# Patient Record
Sex: FEMALE | Race: WHITE | NOT HISPANIC OR LATINO | Employment: UNEMPLOYED | URBAN - METROPOLITAN AREA
[De-identification: names, ages, dates, MRNs, and addresses within clinical notes are randomized per-mention and may not be internally consistent; named-entity substitution may affect disease eponyms.]

---

## 2019-02-17 ENCOUNTER — HOSPITAL ENCOUNTER (OUTPATIENT)
Facility: HOSPITAL | Age: 64
Discharge: HOME | End: 2019-02-19
Attending: EMERGENCY MEDICINE | Admitting: HOSPITALIST
Payer: COMMERCIAL

## 2019-02-17 DIAGNOSIS — R11.2 NAUSEA & VOMITING: ICD-10-CM

## 2019-02-17 DIAGNOSIS — R11.2 INTRACTABLE NAUSEA AND VOMITING: ICD-10-CM

## 2019-02-17 DIAGNOSIS — A41.9 SEPSIS: ICD-10-CM

## 2019-02-17 PROBLEM — R19.7 DIARRHEA OF PRESUMED INFECTIOUS ORIGIN: Status: ACTIVE | Noted: 2019-02-17

## 2019-02-17 LAB
ALBUMIN SERPL BCP-MCNC: 4.3 G/DL
ALLENS TEST: ABNORMAL
ALP SERPL-CCNC: 102 U/L
ALT SERPL W/O P-5'-P-CCNC: 23 U/L
ANION GAP SERPL CALC-SCNC: 12 MMOL/L
ANION GAP SERPL CALC-SCNC: 14 MMOL/L
AST SERPL-CCNC: 23 U/L
BASOPHILS # BLD AUTO: 0.02 K/UL
BASOPHILS NFR BLD: 0.2 %
BILIRUB SERPL-MCNC: 0.5 MG/DL
BILIRUB UR QL STRIP: NEGATIVE
BUN SERPL-MCNC: 11 MG/DL
BUN SERPL-MCNC: 16 MG/DL
CALCIUM SERPL-MCNC: 8.9 MG/DL
CALCIUM SERPL-MCNC: 9.7 MG/DL
CHLORIDE SERPL-SCNC: 101 MMOL/L
CHLORIDE SERPL-SCNC: 102 MMOL/L
CLARITY UR REFRACT.AUTO: CLEAR
CO2 SERPL-SCNC: 22 MMOL/L
CO2 SERPL-SCNC: 24 MMOL/L
COLOR UR AUTO: ABNORMAL
CREAT SERPL-MCNC: 0.8 MG/DL
CREAT SERPL-MCNC: 0.8 MG/DL
CTP QC/QA: YES
DIFFERENTIAL METHOD: ABNORMAL
EOSINOPHIL # BLD AUTO: 0.1 K/UL
EOSINOPHIL NFR BLD: 0.8 %
ERYTHROCYTE [DISTWIDTH] IN BLOOD BY AUTOMATED COUNT: 14.1 %
EST. GFR  (AFRICAN AMERICAN): >60 ML/MIN/1.73 M^2
EST. GFR  (AFRICAN AMERICAN): >60 ML/MIN/1.73 M^2
EST. GFR  (NON AFRICAN AMERICAN): >60 ML/MIN/1.73 M^2
EST. GFR  (NON AFRICAN AMERICAN): >60 ML/MIN/1.73 M^2
GLUCOSE SERPL-MCNC: 107 MG/DL
GLUCOSE SERPL-MCNC: 143 MG/DL
GLUCOSE UR QL STRIP: NEGATIVE
HCO3 UR-SCNC: 22.4 MMOL/L (ref 24–28)
HCT VFR BLD AUTO: 42.8 %
HGB BLD-MCNC: 13.6 G/DL
HGB UR QL STRIP: NEGATIVE
IMM GRANULOCYTES # BLD AUTO: 0.02 K/UL
IMM GRANULOCYTES NFR BLD AUTO: 0.2 %
INR PPP: 0.9
KETONES UR QL STRIP: NEGATIVE
LACTATE SERPL-SCNC: 1.4 MMOL/L
LACTATE SERPL-SCNC: 3.3 MMOL/L
LDH SERPL L TO P-CCNC: 243 U/L
LDH SERPL L TO P-CCNC: 5.19 MMOL/L (ref 0.5–2.2)
LEUKOCYTE ESTERASE UR QL STRIP: NEGATIVE
LIPASE SERPL-CCNC: 14 U/L
LYMPHOCYTES # BLD AUTO: 0.2 K/UL
LYMPHOCYTES NFR BLD: 2.6 %
MAGNESIUM SERPL-MCNC: 1.8 MG/DL
MCH RBC QN AUTO: 29.2 PG
MCHC RBC AUTO-ENTMCNC: 31.8 G/DL
MCV RBC AUTO: 92 FL
MONOCYTES # BLD AUTO: 0.5 K/UL
MONOCYTES NFR BLD: 4.9 %
NEUTROPHILS # BLD AUTO: 8.4 K/UL
NEUTROPHILS NFR BLD: 91.3 %
NITRITE UR QL STRIP: NEGATIVE
NRBC BLD-RTO: 0 /100 WBC
PCO2 BLDA: 38.2 MMHG (ref 35–45)
PH SMN: 7.38 [PH] (ref 7.35–7.45)
PH UR STRIP: >8 [PH] (ref 5–8)
PHOSPHATE SERPL-MCNC: 3.5 MG/DL
PLATELET # BLD AUTO: 308 K/UL
PMV BLD AUTO: 10.2 FL
PO2 BLDA: 25 MMHG (ref 40–60)
POC BE: -3 MMOL/L
POC MOLECULAR INFLUENZA A AGN: NEGATIVE
POC MOLECULAR INFLUENZA B AGN: NEGATIVE
POC SATURATED O2: 43 % (ref 95–100)
POC TCO2: 24 MMOL/L (ref 24–29)
POTASSIUM SERPL-SCNC: 3.4 MMOL/L
POTASSIUM SERPL-SCNC: 4.7 MMOL/L
PROCALCITONIN SERPL IA-MCNC: 0.31 NG/ML
PROT SERPL-MCNC: 8.1 G/DL
PROT UR QL STRIP: NEGATIVE
PROTHROMBIN TIME: 9.5 SEC
RBC # BLD AUTO: 4.66 M/UL
SAMPLE: ABNORMAL
SITE: ABNORMAL
SODIUM SERPL-SCNC: 137 MMOL/L
SODIUM SERPL-SCNC: 138 MMOL/L
SP GR UR STRIP: 1.02 (ref 1–1.03)
URN SPEC COLLECT METH UR: ABNORMAL
WBC # BLD AUTO: 9.21 K/UL

## 2019-02-17 PROCEDURE — 25000003 PHARM REV CODE 250: Performed by: PHYSICIAN ASSISTANT

## 2019-02-17 PROCEDURE — 63600175 PHARM REV CODE 636 W HCPCS: Performed by: HOSPITALIST

## 2019-02-17 PROCEDURE — 83690 ASSAY OF LIPASE: CPT

## 2019-02-17 PROCEDURE — 83605 ASSAY OF LACTIC ACID: CPT

## 2019-02-17 PROCEDURE — 25000003 PHARM REV CODE 250: Performed by: STUDENT IN AN ORGANIZED HEALTH CARE EDUCATION/TRAINING PROGRAM

## 2019-02-17 PROCEDURE — 99900035 HC TECH TIME PER 15 MIN (STAT)

## 2019-02-17 PROCEDURE — 83605 ASSAY OF LACTIC ACID: CPT | Mod: 91

## 2019-02-17 PROCEDURE — 80048 BASIC METABOLIC PNL TOTAL CA: CPT

## 2019-02-17 PROCEDURE — 83735 ASSAY OF MAGNESIUM: CPT

## 2019-02-17 PROCEDURE — 83615 LACTATE (LD) (LDH) ENZYME: CPT

## 2019-02-17 PROCEDURE — 63600175 PHARM REV CODE 636 W HCPCS: Performed by: STUDENT IN AN ORGANIZED HEALTH CARE EDUCATION/TRAINING PROGRAM

## 2019-02-17 PROCEDURE — G0378 HOSPITAL OBSERVATION PER HR: HCPCS

## 2019-02-17 PROCEDURE — 25000003 PHARM REV CODE 250: Performed by: HOSPITALIST

## 2019-02-17 PROCEDURE — 87040 BLOOD CULTURE FOR BACTERIA: CPT

## 2019-02-17 PROCEDURE — 96367 TX/PROPH/DG ADDL SEQ IV INF: CPT

## 2019-02-17 PROCEDURE — 80053 COMPREHEN METABOLIC PANEL: CPT

## 2019-02-17 PROCEDURE — 99291 PR CRITICAL CARE, E/M 30-74 MINUTES: ICD-10-PCS | Mod: ,,, | Performed by: EMERGENCY MEDICINE

## 2019-02-17 PROCEDURE — 96372 THER/PROPH/DIAG INJ SC/IM: CPT | Mod: 59

## 2019-02-17 PROCEDURE — 84100 ASSAY OF PHOSPHORUS: CPT

## 2019-02-17 PROCEDURE — 63600175 PHARM REV CODE 636 W HCPCS: Performed by: EMERGENCY MEDICINE

## 2019-02-17 PROCEDURE — S0030 INJECTION, METRONIDAZOLE: HCPCS | Performed by: PHYSICIAN ASSISTANT

## 2019-02-17 PROCEDURE — 96366 THER/PROPH/DIAG IV INF ADDON: CPT

## 2019-02-17 PROCEDURE — 99285 EMERGENCY DEPT VISIT HI MDM: CPT | Mod: 25

## 2019-02-17 PROCEDURE — 63600175 PHARM REV CODE 636 W HCPCS: Performed by: PHYSICIAN ASSISTANT

## 2019-02-17 PROCEDURE — 99291 CRITICAL CARE FIRST HOUR: CPT | Mod: ,,, | Performed by: EMERGENCY MEDICINE

## 2019-02-17 PROCEDURE — 82803 BLOOD GASES ANY COMBINATION: CPT

## 2019-02-17 PROCEDURE — 85610 PROTHROMBIN TIME: CPT

## 2019-02-17 PROCEDURE — 85025 COMPLETE CBC W/AUTO DIFF WBC: CPT

## 2019-02-17 PROCEDURE — 99220 PR INITIAL OBSERVATION CARE,LEVL III: ICD-10-PCS | Mod: ,,, | Performed by: HOSPITALIST

## 2019-02-17 PROCEDURE — 99220 PR INITIAL OBSERVATION CARE,LEVL III: CPT | Mod: ,,, | Performed by: HOSPITALIST

## 2019-02-17 PROCEDURE — 93005 ELECTROCARDIOGRAM TRACING: CPT

## 2019-02-17 PROCEDURE — 81003 URINALYSIS AUTO W/O SCOPE: CPT

## 2019-02-17 PROCEDURE — 93010 ELECTROCARDIOGRAM REPORT: CPT | Mod: ,,, | Performed by: INTERNAL MEDICINE

## 2019-02-17 PROCEDURE — 36415 COLL VENOUS BLD VENIPUNCTURE: CPT

## 2019-02-17 PROCEDURE — 96365 THER/PROPH/DIAG IV INF INIT: CPT

## 2019-02-17 PROCEDURE — 93010 EKG 12-LEAD: ICD-10-PCS | Mod: ,,, | Performed by: INTERNAL MEDICINE

## 2019-02-17 PROCEDURE — 84145 PROCALCITONIN (PCT): CPT

## 2019-02-17 PROCEDURE — 25500020 PHARM REV CODE 255: Performed by: EMERGENCY MEDICINE

## 2019-02-17 RX ORDER — SODIUM CHLORIDE 9 MG/ML
INJECTION, SOLUTION INTRAVENOUS CONTINUOUS
Status: ACTIVE | OUTPATIENT
Start: 2019-02-17 | End: 2019-02-18

## 2019-02-17 RX ORDER — ONDANSETRON 2 MG/ML
4 INJECTION INTRAMUSCULAR; INTRAVENOUS EVERY 8 HOURS PRN
Status: DISCONTINUED | OUTPATIENT
Start: 2019-02-17 | End: 2019-02-19 | Stop reason: HOSPADM

## 2019-02-17 RX ORDER — LEVOTHYROXINE SODIUM 50 UG/1
50 TABLET ORAL
Status: DISCONTINUED | OUTPATIENT
Start: 2019-02-18 | End: 2019-02-19 | Stop reason: HOSPADM

## 2019-02-17 RX ORDER — ACETAMINOPHEN 325 MG/1
650 TABLET ORAL EVERY 8 HOURS PRN
Status: DISCONTINUED | OUTPATIENT
Start: 2019-02-17 | End: 2019-02-17

## 2019-02-17 RX ORDER — ACETAMINOPHEN 650 MG/1
650 SUPPOSITORY RECTAL
Status: COMPLETED | OUTPATIENT
Start: 2019-02-17 | End: 2019-02-17

## 2019-02-17 RX ORDER — ACETAMINOPHEN 500 MG
1000 TABLET ORAL
Status: DISCONTINUED | OUTPATIENT
Start: 2019-02-17 | End: 2019-02-17

## 2019-02-17 RX ORDER — LORAZEPAM 2 MG/ML
1 INJECTION INTRAMUSCULAR EVERY 4 HOURS PRN
Status: DISCONTINUED | OUTPATIENT
Start: 2019-02-17 | End: 2019-02-19 | Stop reason: HOSPADM

## 2019-02-17 RX ORDER — ONDANSETRON 2 MG/ML
4 INJECTION INTRAMUSCULAR; INTRAVENOUS EVERY 6 HOURS PRN
Status: DISCONTINUED | OUTPATIENT
Start: 2019-02-17 | End: 2019-02-17

## 2019-02-17 RX ORDER — MORPHINE SULFATE 4 MG/ML
4 INJECTION, SOLUTION INTRAMUSCULAR; INTRAVENOUS
Status: COMPLETED | OUTPATIENT
Start: 2019-02-17 | End: 2019-02-17

## 2019-02-17 RX ORDER — ROSUVASTATIN CALCIUM 10 MG/1
10 TABLET, COATED ORAL DAILY
Status: DISCONTINUED | OUTPATIENT
Start: 2019-02-18 | End: 2019-02-19 | Stop reason: HOSPADM

## 2019-02-17 RX ORDER — LORAZEPAM 0.5 MG/1
0.5 TABLET ORAL 2 TIMES DAILY
Status: DISCONTINUED | OUTPATIENT
Start: 2019-02-17 | End: 2019-02-19 | Stop reason: HOSPADM

## 2019-02-17 RX ORDER — PROMETHAZINE HYDROCHLORIDE 25 MG/ML
25 INJECTION, SOLUTION INTRAMUSCULAR; INTRAVENOUS
Status: DISCONTINUED | OUTPATIENT
Start: 2019-02-17 | End: 2019-02-17

## 2019-02-17 RX ORDER — LORAZEPAM 2 MG/ML
1 INJECTION INTRAMUSCULAR ONCE
Status: DISCONTINUED | OUTPATIENT
Start: 2019-02-17 | End: 2019-02-17

## 2019-02-17 RX ORDER — HALOPERIDOL 5 MG/ML
5 INJECTION INTRAMUSCULAR ONCE
Status: COMPLETED | OUTPATIENT
Start: 2019-02-17 | End: 2019-02-17

## 2019-02-17 RX ORDER — CIPROFLOXACIN 2 MG/ML
400 INJECTION, SOLUTION INTRAVENOUS
Status: COMPLETED | OUTPATIENT
Start: 2019-02-17 | End: 2019-02-17

## 2019-02-17 RX ORDER — FLUVOXAMINE MALEATE 50 MG/1
75 TABLET ORAL 2 TIMES DAILY
Status: DISCONTINUED | OUTPATIENT
Start: 2019-02-17 | End: 2019-02-18

## 2019-02-17 RX ORDER — METRONIDAZOLE 500 MG/1
500 TABLET ORAL EVERY 8 HOURS
Status: DISCONTINUED | OUTPATIENT
Start: 2019-02-17 | End: 2019-02-17

## 2019-02-17 RX ORDER — ONDANSETRON 2 MG/ML
8 INJECTION INTRAMUSCULAR; INTRAVENOUS
Status: COMPLETED | OUTPATIENT
Start: 2019-02-17 | End: 2019-02-17

## 2019-02-17 RX ORDER — ZOLPIDEM TARTRATE 5 MG/1
5 TABLET ORAL NIGHTLY
Status: DISCONTINUED | OUTPATIENT
Start: 2019-02-17 | End: 2019-02-17

## 2019-02-17 RX ORDER — SODIUM CHLORIDE 0.9 % (FLUSH) 0.9 %
5 SYRINGE (ML) INJECTION
Status: DISCONTINUED | OUTPATIENT
Start: 2019-02-17 | End: 2019-02-19 | Stop reason: HOSPADM

## 2019-02-17 RX ORDER — METRONIDAZOLE 500 MG/100ML
500 INJECTION, SOLUTION INTRAVENOUS
Status: DISCONTINUED | OUTPATIENT
Start: 2019-02-17 | End: 2019-02-19 | Stop reason: HOSPADM

## 2019-02-17 RX ORDER — ACETAMINOPHEN 650 MG/1
650 SUPPOSITORY RECTAL EVERY 4 HOURS PRN
Status: DISCONTINUED | OUTPATIENT
Start: 2019-02-17 | End: 2019-02-19

## 2019-02-17 RX ORDER — MORPHINE SULFATE 2 MG/ML
2 INJECTION, SOLUTION INTRAMUSCULAR; INTRAVENOUS
Status: DISCONTINUED | OUTPATIENT
Start: 2019-02-17 | End: 2019-02-18

## 2019-02-17 RX ORDER — ONDANSETRON 2 MG/ML
4 INJECTION INTRAMUSCULAR; INTRAVENOUS EVERY 8 HOURS PRN
Status: DISCONTINUED | OUTPATIENT
Start: 2019-02-17 | End: 2019-02-17

## 2019-02-17 RX ORDER — ZOLPIDEM TARTRATE 5 MG/1
5 TABLET ORAL NIGHTLY PRN
Status: DISCONTINUED | OUTPATIENT
Start: 2019-02-17 | End: 2019-02-19 | Stop reason: HOSPADM

## 2019-02-17 RX ORDER — CIPROFLOXACIN 2 MG/ML
400 INJECTION, SOLUTION INTRAVENOUS
Status: DISCONTINUED | OUTPATIENT
Start: 2019-02-18 | End: 2019-02-19 | Stop reason: HOSPADM

## 2019-02-17 RX ORDER — MORPHINE SULFATE 2 MG/ML
2 INJECTION, SOLUTION INTRAMUSCULAR; INTRAVENOUS
Status: DISCONTINUED | OUTPATIENT
Start: 2019-02-17 | End: 2019-02-17

## 2019-02-17 RX ORDER — ENOXAPARIN SODIUM 100 MG/ML
40 INJECTION SUBCUTANEOUS EVERY 24 HOURS
Status: DISCONTINUED | OUTPATIENT
Start: 2019-02-18 | End: 2019-02-19 | Stop reason: HOSPADM

## 2019-02-17 RX ORDER — ACETAMINOPHEN 650 MG/1
650 SUPPOSITORY RECTAL EVERY 6 HOURS PRN
Status: DISCONTINUED | OUTPATIENT
Start: 2019-02-17 | End: 2019-02-17

## 2019-02-17 RX ORDER — PROMETHAZINE HYDROCHLORIDE 25 MG/ML
25 INJECTION, SOLUTION INTRAMUSCULAR; INTRAVENOUS
Status: COMPLETED | OUTPATIENT
Start: 2019-02-17 | End: 2019-02-17

## 2019-02-17 RX ADMIN — Medication 2 MG: at 05:02

## 2019-02-17 RX ADMIN — ONDANSETRON 8 MG: 2 INJECTION INTRAMUSCULAR; INTRAVENOUS at 07:02

## 2019-02-17 RX ADMIN — PROMETHAZINE HYDROCHLORIDE 25 MG: 25 INJECTION INTRAMUSCULAR; INTRAVENOUS at 08:02

## 2019-02-17 RX ADMIN — METRONIDAZOLE 500 MG: 500 INJECTION, SOLUTION INTRAVENOUS at 08:02

## 2019-02-17 RX ADMIN — SODIUM CHLORIDE: 0.9 INJECTION, SOLUTION INTRAVENOUS at 05:02

## 2019-02-17 RX ADMIN — LORAZEPAM 1 MG: 2 INJECTION INTRAMUSCULAR; INTRAVENOUS at 04:02

## 2019-02-17 RX ADMIN — ACETAMINOPHEN 650 MG: 650 SUPPOSITORY RECTAL at 11:02

## 2019-02-17 RX ADMIN — LORAZEPAM 1 MG: 2 INJECTION INTRAMUSCULAR; INTRAVENOUS at 11:02

## 2019-02-17 RX ADMIN — IOHEXOL 100 ML: 350 INJECTION, SOLUTION INTRAVENOUS at 10:02

## 2019-02-17 RX ADMIN — SODIUM CHLORIDE, SODIUM LACTATE, POTASSIUM CHLORIDE, AND CALCIUM CHLORIDE 1000 ML: .6; .31; .03; .02 INJECTION, SOLUTION INTRAVENOUS at 08:02

## 2019-02-17 RX ADMIN — PROMETHAZINE HYDROCHLORIDE 25 MG: 25 INJECTION INTRAMUSCULAR; INTRAVENOUS at 10:02

## 2019-02-17 RX ADMIN — MORPHINE SULFATE 4 MG: 4 INJECTION INTRAVENOUS at 12:02

## 2019-02-17 RX ADMIN — ACETAMINOPHEN 650 MG: 650 SUPPOSITORY RECTAL at 05:02

## 2019-02-17 RX ADMIN — HALOPERIDOL LACTATE 5 MG: 5 INJECTION, SOLUTION INTRAMUSCULAR at 08:02

## 2019-02-17 RX ADMIN — SODIUM CHLORIDE, SODIUM LACTATE, POTASSIUM CHLORIDE, AND CALCIUM CHLORIDE 1000 ML: .6; .31; .03; .02 INJECTION, SOLUTION INTRAVENOUS at 07:02

## 2019-02-17 RX ADMIN — MORPHINE SULFATE 4 MG: 4 INJECTION INTRAVENOUS at 08:02

## 2019-02-17 RX ADMIN — CIPROFLOXACIN 400 MG: 2 INJECTION, SOLUTION INTRAVENOUS at 01:02

## 2019-02-17 RX ADMIN — ACETAMINOPHEN 650 MG: 650 SUPPOSITORY RECTAL at 09:02

## 2019-02-17 RX ADMIN — Medication 2 MG: at 08:02

## 2019-02-17 RX ADMIN — ONDANSETRON 4 MG: 2 INJECTION INTRAMUSCULAR; INTRAVENOUS at 03:02

## 2019-02-17 RX ADMIN — PROMETHAZINE HYDROCHLORIDE 12.5 MG: 25 INJECTION INTRAMUSCULAR; INTRAVENOUS at 05:02

## 2019-02-17 RX ADMIN — SODIUM CHLORIDE, SODIUM LACTATE, POTASSIUM CHLORIDE, AND CALCIUM CHLORIDE 1000 ML: .6; .31; .03; .02 INJECTION, SOLUTION INTRAVENOUS at 10:02

## 2019-02-17 NOTE — ED NOTES
Patient c/o burning and itching with Cipro infusion, PIV and site assessed as documented, flushed line with 10cc NS, patient tolerated well and denied any further irritation. Cipro res

## 2019-02-17 NOTE — ED PROVIDER NOTES
Encounter Date: 2/17/2019    SCRIBE #1 NOTE: I, Claudine Solorzano, am scribing for, and in the presence of,  Dr. Cortés. I have scribed the following portions of the note - the Resident attestation.       History     Chief Complaint   Patient presents with    Emesis     Patient reports n/v/d since last night after eating fish last night     Mrs. Brown is a 64 yo F with a PMH of hypothyroid and hyperlipidemia who presents today with sudden onset nausea and vomiting. Pt reports that nausea and vomiting began after broiled fish at a restaurant yesterday. The rest of her family had fried fish and report no symptoms. Within 1 hour after eating dinner she reported significant abdominal discomfort-constant,  8/10, diffuse, cramping. Within 2-3 hours the vomiting began. She vomited 5x overnight. The vomit was never dark black or red and always contained food products. She had multiple episodes of loose bowl movements as well. Pt reports in the past she had refractory C.diff, sigmoid colectomy, and cholecystectomy but has had no abdominal complaints in the past few years.           Review of patient's allergies indicates:   Allergen Reactions    Compazine [prochlorperazine] Anxiety     Past Medical History:   Diagnosis Date    GERD (gastroesophageal reflux disease)     Hypothyroid     IBS (irritable bowel syndrome)      Past Surgical History:   Procedure Laterality Date    CHOLECYSTECTOMY      COLPORRHAPHY      with sphincteroplasty    OOPHORECTOMY      SIGMOIDECTOMY      TOTAL ABDOMINAL HYSTERECTOMY       Family History   Problem Relation Age of Onset    Colon cancer Neg Hx     Inflammatory bowel disease Neg Hx     Celiac disease Neg Hx      Social History     Tobacco Use    Smoking status: Never Smoker   Substance Use Topics    Alcohol use: Not on file    Drug use: Not on file     Review of Systems   Constitutional: Positive for appetite change. Negative for activity change, chills, fatigue and fever.    HENT: Negative for congestion, drooling, facial swelling, mouth sores, nosebleeds, rhinorrhea, sinus pressure, sore throat and tinnitus.    Eyes: Negative for photophobia and visual disturbance.   Respiratory: Negative for apnea, cough, choking, chest tightness and shortness of breath.    Cardiovascular: Negative for chest pain, palpitations and leg swelling.   Gastrointestinal: Positive for abdominal pain, diarrhea, nausea and vomiting. Negative for abdominal distention, blood in stool and rectal pain.   Endocrine: Negative for polyphagia and polyuria.   Genitourinary: Negative for difficulty urinating.   Musculoskeletal: Negative for arthralgias.   Skin: Negative for rash.   Neurological: Negative for dizziness, weakness, light-headedness, numbness and headaches.   Psychiatric/Behavioral: Negative for agitation, behavioral problems and confusion.       Physical Exam     Initial Vitals [02/17/19 0619]   BP Pulse Resp Temp SpO2   137/76 (!) 115 18 98 °F (36.7 °C) 95 %      MAP       --         Physical Exam    Constitutional: She appears well-developed and well-nourished. She is diaphoretic.   HENT:   Head: Normocephalic and atraumatic.   Nose: Nose normal.   Mouth/Throat: No oropharyngeal exudate.   Eyes: Conjunctivae and EOM are normal. Pupils are equal, round, and reactive to light.   Neck: Normal range of motion. Neck supple. No tracheal deviation present. No JVD present.   Cardiovascular: Normal rate, normal heart sounds and intact distal pulses.   No murmur heard.  Pulmonary/Chest: Breath sounds normal. No respiratory distress. She has no wheezes. She has no rales.   Abdominal: Soft. Bowel sounds are normal. She exhibits no distension. There is tenderness. There is no rebound and no guarding.   Diffuse generalized abdominal pain     Musculoskeletal: Normal range of motion.   Neurological: She is alert and oriented to person, place, and time. She has normal strength. GCS score is 15. GCS eye subscore is 4.  GCS verbal subscore is 5. GCS motor subscore is 6.   Skin: Skin is warm. No rash noted. No erythema.   Psychiatric: She has a normal mood and affect. Thought content normal.         ED Course   Procedures  Labs Reviewed   CBC W/ AUTO DIFFERENTIAL - Abnormal; Notable for the following components:       Result Value    MCHC 31.8 (*)     Gran # (ANC) 8.4 (*)     Lymph # 0.2 (*)     Gran% 91.3 (*)     Lymph% 2.6 (*)     All other components within normal limits   COMPREHENSIVE METABOLIC PANEL - Abnormal; Notable for the following components:    CO2 22 (*)     Glucose 143 (*)     All other components within normal limits   URINALYSIS, REFLEX TO URINE CULTURE - Abnormal; Notable for the following components:    pH, UA >8.0 (*)     All other components within normal limits    Narrative:     Preferred Collection Type->Urine, Clean Catch   LACTIC ACID, PLASMA - Abnormal; Notable for the following components:    Lactate (Lactic Acid) 3.3 (*)     All other components within normal limits   ISTAT PROCEDURE - Abnormal; Notable for the following components:    POC PO2 25 (*)     POC HCO3 22.4 (*)     POC SATURATED O2 43 (*)     POC Lactate 5.19 (*)     All other components within normal limits   CULTURE, STOOL   CLOSTRIDIUM DIFFICILE   LIPASE   STOOL EXAM-OVA,CYSTS,PARASITES   WBC, STOOL   POCT INFLUENZA A/B MOLECULAR        ECG Results    None       Imaging Results          CT Abdomen Pelvis With Contrast (Final result)  Result time 02/17/19 10:20:43    Final result by Margarito Ramirez DO (02/17/19 10:20:43)                 Impression:      Hepatomegaly with diffuse fatty infiltration of the liver.    Subtle induration small bowel mesentery with few scattered nonenlarged mesenteric lymph nodes while nonspecific differential to include mesenteric panniculitis.  Clinical correlation and follow-up advised    Small right hepatic cyst.    Remote operative change from cholecystectomy, partial sigmoid resection and  hysterectomy.    Colonic diverticulosis without evidence for acute diverticulitis.    Otherwise unremarkable CT scan abdomen pelvis as detailed above      Electronically signed by: Margarito DO James  Date:    02/17/2019  Time:    10:20             Narrative:    EXAMINATION:  CT ABDOMEN PELVIS WITH CONTRAST    CLINICAL HISTORY:  Abdominal distension;    TECHNIQUE:  Low dose axial images, sagittal and coronal reformations were obtained from the lung bases to the pubic symphysis following the IV administration of 100 mL of Omnipaque 350 .  Oral contrast was not given.    COMPARISON:  None.    FINDINGS:  There is no consolidation in the visualized lungs.  The liver is enlarged measuring 19 cm in craniocaudal dimension.  There is diffuse decreased attenuation liver concerning for fatty infiltration.  Subcentimeter hypodensity within the right lobe of the liver which most suggestive for cyst with Hounsfield units of 6.    There is slight    The spleen and pancreas are normal in size without focal lesion.  Postoperative change from cholecystectomy with surgical clips in the gallbladder fossa.  The adrenal glands are within normal limits bilaterally.    The kidneys are normal in size without hydronephrosis.  There is uptake and excretion of contrast bilaterally.  No signs of appendicitis with normal appendix identified.    Remote operative change from hysterectomy.  No focal pelvic fluid collection or adenopathy.  There is operative change from partial sigmoid resection with anastomosis.  There is colonic diverticulosis without evidence for acute diverticulitis.  No focal dilated loop of bowel to suggest bowel obstruction with scattered fluid and gas within loops of small bowel.    Atherosclerotic plaquing of the aorta.    Gas density within left gluteal soft tissues.    Subtle induration with the small bowel mesentery with few nonenlarged mesenteric lymph nodes concerning for possible mesenteric panniculitis.                                  Medical Decision Making:   History:   Old Medical Records: I decided to obtain old medical records.  Initial Assessment:   Pt reports sudden onset nausea and vomiting within 1 hour after eating boiled fish. Pt had 5x episodes of vomiting food overnight. Vomit only contained food and was not dark black or bright red. Pt also reports loose bowl movements. Pt reports generalized abdominal pain and had constant retching during exam.  Differential Diagnosis:   Food poisoning, staph enteritis from food poisoning, SBO, pancreatitis  Clinical Tests:   Lab Tests: Ordered and Reviewed  Radiological Study: Ordered and Reviewed  Medical Tests: Ordered and Reviewed  ED Management:  CBC  CMP  Lipase  IV zofran  IV fluids    8:45 AM  -Cbc, cmp, lipase wnl  -IV phenergan administered    9:33 AM  -pt reports continued nausea and abdominal pain. Not improving  -ordered CT abdomen    10:55 AM  Pt has fever of 101.2  Blood cultures ordered  tylenol     10:49 AM  Patient continues to have diffused abdominal tenderness, nausea, and unable to tolerate PO. Patient appears uncomfortable. Vitals concerning or tachycardia in the 115-120 despite iv fluid hydration. Repeat temp taken 101.6 Patient blood cultures, flu swab, and tylenol ordered.    11:06 AM  On reevaluation, patient complaining of abdominal pain and retching. Labs show lactate of 5 after 2 liters of IV fluids. CT concerning for possible mesenteric ischemia vs. Panniculitis. General surgery paged.     11:42 AM  General surgery evaluated patient. They believe sx are not consistent with mesenteric ischemia. Will discuss with staff. Likely admit for sympotmatic and contractional emesis and serial abdominal exams.  Other:   I have discussed this case with another health care provider.            Scribe Attestation:   Scribe #1: I performed the above scribed service and the documentation accurately describes the services I performed. I attest to the accuracy of the  note.    Attending Attestation:   Physician Attestation Statement for Resident:  As the supervising MD   Physician Attestation Statement: I have personally seen and examined this patient.   I agree with the above history. -: Emergent evaluation 63-year-old female history of IBS, remote C diff history here today with acute onset nausea vomiting diarrhea since approximately since last night after eating broiled fish.  Patient reports greater than 5 episodes of nausea and vomiting with continuous retching, loose stools but not watery.  No recent antibiotic use.  No other sick contacts.   As the supervising MD I agree with the above PE.   -: General:  Appears uncomfortable, actively retching at bedside  Cardiac:  Normal S1-S2  Lungs:  Clear to auscultation  Abdomen:  Diffuse abdominal tenderness with no rebound or guarding, mild abdominal distention, hyperactive bowel sounds  Extremity:  No peripheral edema   As the supervising MD I agree with the above treatment, course, plan, and disposition.   -: 7:30 am  Delay in initial labs secondary to difficult IV access.  Multiple attempts were made with ultrasound guidance without success.  Finally right ankle vein was cannulated without issue and flowing well.    8:00 AM  Labs are unremarkable. Will continue IV fluids and antiemetics.      9:30 AM pt re-evaluated, continued pain and retching despite medications.  CT ordered    10:20 AM CT abdomen pelvis with subtle induration of small bowel mesentery with scattered nonspecific mesenteric lymph nodes- possible mesenteric panniculiits .  General surgery was consulted, they evaluated patient- no acute surgical intervention.  Will continue to monitor with serial abdominal exams.  POC lactate ordered    11:00 AM notified by nurse that patient spiked a fever 101 and is tachycardic.  POC lactate is 5.0- ? Infectious vs dehydration. Tylenol ordered.   cultures ordered.    Pt treated with cipro for possible infectious etiology.   Patient will be admitted for further treatment and evaluation.  I have reviewed and agree with the residents interpretation of the following: lab data, CT scans and EKG.        Attending Critical Care:   Critical Care Times:   ==============================================================  · Total Critical Care Time - exclusive of procedural time: 45 minutes.  ==============================================================  Critical care was necessary to treat or prevent imminent or life-threatening deterioration of the following conditions: dehydration (N/V/D).   Critical care was time spent personally by me on the following activities: examination of patient, review of old charts, ordering lab, x-rays, and/or EKG, re-evaluation of patient's conition and evaluation of patient's response to treatment.   Critical Care Condition: potentially life-threatening                  Clinical Impression:   Diagnoses of Intractable nausea and vomiting, Nausea & vomiting, and Sepsis were pertinent to this visit.      Disposition:   Disposition: Discharged  Condition: Stable                        Florence Cortés MD  02/18/19 [id number]

## 2019-02-17 NOTE — ED NOTES
Halina Brown, 63 y.o. female, presents to the ED w/ complaint of consistent vomiting which began around 0100; pt states that she is in town visiting and ate fried fish last night which may have caused these symptoms; pt denies food allergies and/or SOB; endorses multiple episodes of vomiting, diarrhea, dizziness, and weakness; denies LOC; denies other concerns at this time     Triage note:  Chief Complaint   Patient presents with    Emesis     Patient reports n/v/d since last night after eating fish last night       The following is provided by the pt and/or family, as well as information provided in pt's chart:      Review of patient's allergies indicates:   Allergen Reactions    Compazine [prochlorperazine] Anxiety     Past Medical History:   Diagnosis Date    GERD (gastroesophageal reflux disease)     Hypothyroid     IBS (irritable bowel syndrome)        Pt identity confirmed; pt educated on course of action in and purpose of intake area; pt advised to call for assistance when needed; pt educated on reasons for holding PO intake until lab work and imaging have resulted; Pt informed on nearest restroom's location; pt verbalized understanding & agreed

## 2019-02-17 NOTE — CONSULTS
[facility]  General Surgery  Consult Note    Consults  Subjective:     Chief Complaint/Reason for Admission: Nausea, vomiting, loose stools    History of Present Illness: Hlaina Brown is a 63 y.o. female with no significant past medical history who presents to the ER today after having multiple bouts of emesis and loose stools beginning last night after eating fish for dinner. She feels her abdomen is tight. She also feels the need to have more bowel movements but after having had loose stools last night now feels constipated. She continues to have significant nausea. She has a surgical hx of hysterectomy, sigmoidectomy, and cholecystectomy.     No current facility-administered medications on file prior to encounter.      Current Outpatient Medications on File Prior to Encounter   Medication Sig    amitriptyline (ELAVIL) 25 MG tablet Take 1 tablet (25 mg total) by mouth every evening. Take 1/2 tablet each night first week, then increase to 1 tablet each night    CRESTOR 10 mg tablet     dronabinol (MARINOL) 2.5 MG capsule Take 1 capsule (2.5 mg total) by mouth 2 (two) times daily before meals.    fluvoxamine (LUVOX) 50 MG Tab     hydrocodone-acetaminophen 10-325mg (NORCO)  mg Tab     hydrocodone-acetaminophen 5-325mg (NORCO) 5-325 mg per tablet Take 1 tablet by mouth every 4 to 6 hours as needed.    lorazepam (ATIVAN) 1 MG tablet     LYRICA 50 mg capsule     ondansetron (ZOFRAN-ODT) 8 MG TbDL Take 1 tablet (8 mg total) by mouth every 8 (eight) hours as needed.    pantoprazole (PROTONIX) 40 MG tablet Take 1 tablet (40 mg total) by mouth once daily.    promethazine (PHENERGAN) 25 MG tablet Take 1 tablet (25 mg total) by mouth every 4 (four) hours as needed for Nausea.    ranitidine (ZANTAC) 150 MG tablet Take 150 mg by mouth 3 (three) times daily.    sodium,potassium,&mag sulfates (SUPREP BOWEL PREP KIT) 17.5-3.13-1.6 gram SolR Take as directed in instructions     SYNTHROID 50 mcg tablet     tamsulosin (FLOMAX) 0.4 mg Cp24     triamterene-hydrochlorothiazide 37.5-25 mg (DYAZIDE) 37.5-25 mg per capsule     zolpidem (AMBIEN) 10 mg Tab        Review of patient's allergies indicates:   Allergen Reactions    Compazine [prochlorperazine] Anxiety       Past Medical History:   Diagnosis Date    GERD (gastroesophageal reflux disease)     Hypothyroid     IBS (irritable bowel syndrome)      Past Surgical History:   Procedure Laterality Date    CHOLECYSTECTOMY      COLPORRHAPHY      with sphincteroplasty    OOPHORECTOMY      SIGMOIDECTOMY      TOTAL ABDOMINAL HYSTERECTOMY       Family History     None        Tobacco Use    Smoking status: Never Smoker   Substance and Sexual Activity    Alcohol use: Not on file    Drug use: Not on file    Sexual activity: Not on file     Review of Systems   Constitutional: Positive for appetite change and fever. Negative for chills.   Respiratory: Negative for shortness of breath.    Cardiovascular: Negative for chest pain.   Gastrointestinal: Positive for abdominal pain, diarrhea, nausea and vomiting.   All other systems reviewed and are negative.    Objective:     Vital Signs (Most Recent):  Temp: (!) 101.1 °F (38.4 °C) (02/17/19 1059)  Pulse: (!) 113 (02/17/19 1103)  Resp: (!) 38 (02/17/19 1103)  BP: (!) 184/79 (02/17/19 1103)  SpO2: 98 % (02/17/19 1103) Vital Signs (24h Range):  Temp:  [98 °F (36.7 °C)-101.1 °F (38.4 °C)] 101.1 °F (38.4 °C)  Pulse:  [103-115] 113  Resp:  [18-38] 38  SpO2:  [95 %-98 %] 98 %  BP: (137-184)/(72-84) 184/79     Weight: 102.1 kg (225 lb)  Body mass index is 37.44 kg/m².      Intake/Output Summary (Last 24 hours) at 2/17/2019 1146  Last data filed at 2/17/2019 0945  Gross per 24 hour   Intake 1001 ml   Output --   Net 1001 ml       Physical Exam   Constitutional: She is oriented to person, place, and time. She appears well-developed and well-nourished. She appears distressed.   HENT:   Head: Normocephalic and  atraumatic.   Eyes: EOM are normal.   Cardiovascular:   tachycardic   Pulmonary/Chest: Effort normal. No respiratory distress.   Abdominal: Soft. She exhibits no distension. There is no tenderness. There is no rebound and no guarding.   Neurological: She is oriented to person, place, and time. No cranial nerve deficit.   Psychiatric: She has a normal mood and affect. Her behavior is normal.       Significant Labs:  CBC:   Recent Labs   Lab 02/17/19  0758   WBC 9.21   RBC 4.66   HGB 13.6   HCT 42.8      MCV 92   MCH 29.2   MCHC 31.8*     CMP:   Recent Labs   Lab 02/17/19  0758   *   CALCIUM 9.7   ALBUMIN 4.3   PROT 8.1      K 4.7   CO2 22*      BUN 16   CREATININE 0.8   ALKPHOS 102   ALT 23   AST 23   BILITOT 0.5     Lactic Acid: No results for input(s): LACTATE in the last 48 hours.    Significant Diagnostics:  I have reviewed all pertinent imaging results/findings within the past 24 hours.    Assessment/Plan:     Halina Brown is a 63 y.o. female with likely gastroenteritis    Recommend admission to medicine for rehydration  Trend lactic acids  Will continue serial abdominal exams    Thank you for your consult. I will sign off. Please contact us if you have any additional questions.    Gordy Loomis MD  General Surgery  [facility]

## 2019-02-17 NOTE — ED NOTES
Patient transported on stretcher to room 3076A via escort with IV pole and all belongings. Patient AAOx4, VSS, reports pain still managed and nausea is minimal.

## 2019-02-17 NOTE — ASSESSMENT & PLAN NOTE
Scheduled IV phenergan q6h, PRN IV zofran q8h, IV ativan 1 mg for breakthrough N/V not controlled with zofran/phenergan.   EKG to check baseline QTc.   IVF resuscitation, at 150 ml/h x15h.   S/p 2L bolus in ED.   CT abdo not consistent w/ obstruction.

## 2019-02-17 NOTE — ED NOTES
Hourly rounding performed at this time.  at bedside. Patient is in bed awake and alert, resting,reports pain has decreased to 6/10 and she is still nauseous, but dry heaving less frequently. Assessed for need of repositioning and checked positioning of purewick, no adjustment required. Discussed care plan, patient denies any additional needs at this time and has no complaints or questions. Room assessed for safety measures and cleanliness, no action needed at this time. The bed is in low, locked position with side rails up x 2. Personal belongings and call light in reach. Patient is oriented to call light use. Patient verbalizes will call nurse if assistance is needed.

## 2019-02-17 NOTE — ED NOTES
Dr. Cortés at bedside with U/S guided attempt at IV.  18g to left upper arm place, infiltrated.  Dr. Cortés will attempt another line placement.  Pt continues with generalized abd pain and nausea.

## 2019-02-17 NOTE — ED NOTES
Pt reports pain has decreased from 7/10.  Pt states that she continues with nausea.  Dr. Cortés aware and will order further medication.

## 2019-02-17 NOTE — SUBJECTIVE & OBJECTIVE
Past Medical History:   Diagnosis Date    GERD (gastroesophageal reflux disease)     Hypothyroid     IBS (irritable bowel syndrome)        Past Surgical History:   Procedure Laterality Date    CHOLECYSTECTOMY      COLPORRHAPHY      with sphincteroplasty    OOPHORECTOMY      SIGMOIDECTOMY      TOTAL ABDOMINAL HYSTERECTOMY         Review of patient's allergies indicates:   Allergen Reactions    Compazine [prochlorperazine] Anxiety       No current facility-administered medications on file prior to encounter.      Current Outpatient Medications on File Prior to Encounter   Medication Sig    amitriptyline (ELAVIL) 25 MG tablet Take 1 tablet (25 mg total) by mouth every evening. Take 1/2 tablet each night first week, then increase to 1 tablet each night    CRESTOR 10 mg tablet     dronabinol (MARINOL) 2.5 MG capsule Take 1 capsule (2.5 mg total) by mouth 2 (two) times daily before meals.    fluvoxamine (LUVOX) 50 MG Tab     hydrocodone-acetaminophen 10-325mg (NORCO)  mg Tab     hydrocodone-acetaminophen 5-325mg (NORCO) 5-325 mg per tablet Take 1 tablet by mouth every 4 to 6 hours as needed.    lorazepam (ATIVAN) 1 MG tablet     LYRICA 50 mg capsule     ondansetron (ZOFRAN-ODT) 8 MG TbDL Take 1 tablet (8 mg total) by mouth every 8 (eight) hours as needed.    pantoprazole (PROTONIX) 40 MG tablet Take 1 tablet (40 mg total) by mouth once daily.    promethazine (PHENERGAN) 25 MG tablet Take 1 tablet (25 mg total) by mouth every 4 (four) hours as needed for Nausea.    ranitidine (ZANTAC) 150 MG tablet Take 150 mg by mouth 3 (three) times daily.    sodium,potassium,&mag sulfates (SUPREP BOWEL PREP KIT) 17.5-3.13-1.6 gram SolR Take as directed in instructions    SYNTHROID 50 mcg tablet     tamsulosin (FLOMAX) 0.4 mg Cp24     triamterene-hydrochlorothiazide 37.5-25 mg (DYAZIDE) 37.5-25 mg per capsule     zolpidem (AMBIEN) 10 mg Tab      Family History     None        Tobacco Use    Smoking  status: Never Smoker   Substance and Sexual Activity    Alcohol use: Not on file    Drug use: Not on file    Sexual activity: Not on file     Review of Systems   Constitutional: Positive for chills and fever. Negative for activity change, appetite change, diaphoresis, fatigue and unexpected weight change.   Eyes: Negative for photophobia and visual disturbance.   Respiratory: Negative for apnea, cough, choking, shortness of breath, wheezing and stridor.    Cardiovascular: Negative for chest pain, palpitations and leg swelling.   Gastrointestinal: Positive for abdominal pain, diarrhea, nausea and vomiting. Negative for blood in stool and constipation.   Endocrine: Negative for cold intolerance and heat intolerance.   Genitourinary: Negative for difficulty urinating and dysuria.   Musculoskeletal: Negative for arthralgias and myalgias.   Skin: Negative for rash and wound.   Allergic/Immunologic: Negative for immunocompromised state.   Neurological: Positive for headaches. Negative for dizziness and weakness.   Psychiatric/Behavioral: Negative for agitation, behavioral problems and confusion. The patient is nervous/anxious.      Objective:     Vital Signs (Most Recent):  Temp: 99.9 °F (37.7 °C) (02/17/19 1638)  Pulse: 101 (02/17/19 1638)  Resp: (!) 26 (02/17/19 1638)  BP: 137/63 (02/17/19 1638)  SpO2: 97 % (02/17/19 1638) Vital Signs (24h Range):  Temp:  [98 °F (36.7 °C)-102.2 °F (39 °C)] 99.9 °F (37.7 °C)  Pulse:  [101-126] 101  Resp:  [17-38] 26  SpO2:  [95 %-100 %] 97 %  BP: (130-184)/(59-93) 137/63     Weight: 102.1 kg (225 lb)  Body mass index is 37.44 kg/m².    Physical Exam   Constitutional: She is oriented to person, place, and time. She appears well-developed and well-nourished. She appears ill. She appears distressed.   HENT:   Head: Normocephalic and atraumatic.   Mouth/Throat: No oropharyngeal exudate.   Eyes: Conjunctivae and EOM are normal. Pupils are equal, round, and reactive to light. No scleral  icterus.   Neck: Normal range of motion. Neck supple. No tracheal deviation present. No thyromegaly present.   Cardiovascular: Regular rhythm, normal heart sounds and intact distal pulses. Tachycardia present.   No murmur heard.  Pulmonary/Chest: Effort normal and breath sounds normal. No respiratory distress. She has no wheezes. She has no rales. She exhibits no tenderness.   Abdominal: Soft. She exhibits no distension. Bowel sounds are decreased. There is tenderness. There is no rebound and no guarding.   Musculoskeletal: Normal range of motion. She exhibits no edema or tenderness.   Lymphadenopathy:     She has no cervical adenopathy.   Neurological: She is alert and oriented to person, place, and time.   Skin: Skin is warm and dry. No rash noted. She is not diaphoretic. No erythema. No pallor.   Psychiatric: Her behavior is normal. Judgment and thought content normal. Her mood appears anxious.         CRANIAL NERVES     CN III, IV, VI   Pupils are equal, round, and reactive to light.  Extraocular motions are normal.        Significant Labs:   CBC:   Recent Labs   Lab 02/17/19  0758   WBC 9.21   HGB 13.6   HCT 42.8        CMP:   Recent Labs   Lab 02/17/19  0758      K 4.7      CO2 22*   *   BUN 16   CREATININE 0.8   CALCIUM 9.7   PROT 8.1   ALBUMIN 4.3   BILITOT 0.5   ALKPHOS 102   AST 23   ALT 23   ANIONGAP 14   EGFRNONAA >60.0       Significant Imaging: I have reviewed and interpreted all pertinent imaging results/findings within the past 24 hours.     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  Clinical correlation and follow-up advised

## 2019-02-17 NOTE — ED NOTES
"Assumed care, received report from JASON Carr.  at bedside. Patient is in bed awake, alert, anxious, tearful and oriented x 4, cooperative, VSS, except BP and HR elevated, MD is aware, airway open and patent, respirations spontaneous, even and unlabored, with normal rate and rhythm, skin warm, CDI, B inner gluteal fold are red but blanches, patient/spouse instructed on skin breakdown prevention, spouse verbalized understanding, patient just shook her head and stated "I don't care right now, I feel too bad", abd is soft, tender to palpation, and slightly distended, patient continues to report nausea and frequent dry heaving Patient moves all extremities well, no weakness or edema observed. R foot PIV, intact, patent, and currently infusing LR, site is WNL, no erythema, pain or edema to site, dressing is CDI. Patient c/o generalized body aches and pains, and back pain 10/10, refused assistance with repositioning. Assisted patient on bedpan to void. Updated patient/ on current status, discussed care plan, patient denies any additional needs at this time and has no complaints or questions. Room assessed for safety measures and cleanliness, no action needed at this time. The bed is in low, locked position with side rails up x 2. Personal belongings and call light in reach.  is oriented to call light use and verbalizes will call nurse if assistance is needed. Will continue to monitor.         "

## 2019-02-17 NOTE — H&P
"[facility] Hospital Medicine  History & Physical    Patient Name: Halina Brown  MRN: [id number]  Admission Date: 2/17/2019  Attending Physician: Juanita Fernandez*   Primary Care Provider: Sraah Beth Johnson ([facility])    [facility] Medicine Team: [facility] R Juanita Fernandez MD     Patient information was obtained from patient and ER records.     Subjective:     Principal Problem:Sepsis    Chief Complaint:   Chief Complaint   Patient presents with    Emesis     Patient reports n/v/d since last night after eating fish last night        HPI: Mrs. Brown is a 62 yo F w/ GERD, hypothyroid, IBS who presented to the ED in the early hours of 2/17 with sudden onset abdominal pain (described as 10/10 in intensity, cramping in nature, and transient and wandering in location) which began about 10pm last night. Symptoms were associated with multiple episodes (>10) of vomiting (nonbloody) that did not improve following zofran and phenergan at home. Additionally, she had 3 "soft, but not runny" non-bloody bowel movements. She believes her symptoms were precipitated by fish she had at dinner around 8pm. She has never had an episode like this, was in her usual state of health prior to symptom onset, and denies fevers/chills, SOB, CP, syncope, rash/wound, sick contacts.     In the ED, she was found to be febrile, tachycardic, and in acute distress d/t pain/nausea which was not improved with phenergan/zofran. Her pain was transiently improved with morphine but returned 1 hour later. Surgery was consulted due to concern for acute abdomen and they recommended serial abdo exams and hydration. A CT showed subtle induration small bowel mesentery with few scattered nonenlarged mesenteric lymph nodes while nonspecific differential to include mesenteric panniculitis.     Past Medical History:   Diagnosis Date    GERD (gastroesophageal reflux disease)     Hypothyroid     IBS (irritable bowel syndrome)  "       Past Surgical History:   Procedure Laterality Date    CHOLECYSTECTOMY      COLPORRHAPHY      with sphincteroplasty    OOPHORECTOMY      SIGMOIDECTOMY      TOTAL ABDOMINAL HYSTERECTOMY         Review of patient's allergies indicates:   Allergen Reactions    Compazine [prochlorperazine] Anxiety       No current facility-administered medications on file prior to encounter.      Current Outpatient Medications on File Prior to Encounter   Medication Sig    amitriptyline (ELAVIL) 25 MG tablet Take 1 tablet (25 mg total) by mouth every evening. Take 1/2 tablet each night first week, then increase to 1 tablet each night    CRESTOR 10 mg tablet     dronabinol (MARINOL) 2.5 MG capsule Take 1 capsule (2.5 mg total) by mouth 2 (two) times daily before meals.    fluvoxamine (LUVOX) 50 MG Tab     hydrocodone-acetaminophen 10-325mg (NORCO)  mg Tab     hydrocodone-acetaminophen 5-325mg (NORCO) 5-325 mg per tablet Take 1 tablet by mouth every 4 to 6 hours as needed.    lorazepam (ATIVAN) 1 MG tablet     LYRICA 50 mg capsule     ondansetron (ZOFRAN-ODT) 8 MG TbDL Take 1 tablet (8 mg total) by mouth every 8 (eight) hours as needed.    pantoprazole (PROTONIX) 40 MG tablet Take 1 tablet (40 mg total) by mouth once daily.    promethazine (PHENERGAN) 25 MG tablet Take 1 tablet (25 mg total) by mouth every 4 (four) hours as needed for Nausea.    ranitidine (ZANTAC) 150 MG tablet Take 150 mg by mouth 3 (three) times daily.    sodium,potassium,&mag sulfates (SUPREP BOWEL PREP KIT) 17.5-3.13-1.6 gram SolR Take as directed in instructions    SYNTHROID 50 mcg tablet     tamsulosin (FLOMAX) 0.4 mg Cp24     triamterene-hydrochlorothiazide 37.5-25 mg (DYAZIDE) 37.5-25 mg per capsule     zolpidem (AMBIEN) 10 mg Tab      Family History     None        Tobacco Use    Smoking status: Never Smoker   Substance and Sexual Activity    Alcohol use: Not on file    Drug use: Not on file    Sexual activity: Not on file      Review of Systems   Constitutional: Positive for chills and fever. Negative for activity change, appetite change, diaphoresis, fatigue and unexpected weight change.   Eyes: Negative for photophobia and visual disturbance.   Respiratory: Negative for apnea, cough, choking, shortness of breath, wheezing and stridor.    Cardiovascular: Negative for chest pain, palpitations and leg swelling.   Gastrointestinal: Positive for abdominal pain, diarrhea, nausea and vomiting. Negative for blood in stool and constipation.   Endocrine: Negative for cold intolerance and heat intolerance.   Genitourinary: Negative for difficulty urinating and dysuria.   Musculoskeletal: Negative for arthralgias and myalgias.   Skin: Negative for rash and wound.   Allergic/Immunologic: Negative for immunocompromised state.   Neurological: Positive for headaches. Negative for dizziness and weakness.   Psychiatric/Behavioral: Negative for agitation, behavioral problems and confusion. The patient is nervous/anxious.      Objective:     Vital Signs (Most Recent):  Temp: 99.9 °F (37.7 °C) (02/17/19 1638)  Pulse: 101 (02/17/19 1638)  Resp: (!) 26 (02/17/19 1638)  BP: 137/63 (02/17/19 1638)  SpO2: 97 % (02/17/19 1638) Vital Signs (24h Range):  Temp:  [98 °F (36.7 °C)-102.2 °F (39 °C)] 99.9 °F (37.7 °C)  Pulse:  [101-126] 101  Resp:  [17-38] 26  SpO2:  [95 %-100 %] 97 %  BP: (130-184)/(59-93) 137/63     Weight: 102.1 kg (225 lb)  Body mass index is 37.44 kg/m².    Physical Exam   Constitutional: She is oriented to person, place, and time. She appears well-developed and well-nourished. She appears ill. She appears distressed.   HENT:   Head: Normocephalic and atraumatic.   Mouth/Throat: No oropharyngeal exudate.   Eyes: Conjunctivae and EOM are normal. Pupils are equal, round, and reactive to light. No scleral icterus.   Neck: Normal range of motion. Neck supple. No tracheal deviation present. No thyromegaly present.   Cardiovascular: Regular rhythm,  normal heart sounds and intact distal pulses. Tachycardia present.   No murmur heard.  Pulmonary/Chest: Effort normal and breath sounds normal. No respiratory distress. She has no wheezes. She has no rales. She exhibits no tenderness.   Abdominal: Soft. She exhibits no distension. Bowel sounds are decreased. There is tenderness. There is no rebound and no guarding.   Musculoskeletal: Normal range of motion. She exhibits no edema or tenderness.   Lymphadenopathy:     She has no cervical adenopathy.   Neurological: She is alert and oriented to person, place, and time.   Skin: Skin is warm and dry. No rash noted. She is not diaphoretic. No erythema. No pallor.   Psychiatric: Her behavior is normal. Judgment and thought content normal. Her mood appears anxious.         CRANIAL NERVES     CN III, IV, VI   Pupils are equal, round, and reactive to light.  Extraocular motions are normal.        Significant Labs:   CBC:   Recent Labs   Lab 02/17/19  0758   WBC 9.21   HGB 13.6   HCT 42.8        CMP:   Recent Labs   Lab 02/17/19  0758      K 4.7      CO2 22*   *   BUN 16   CREATININE 0.8   CALCIUM 9.7   PROT 8.1   ALBUMIN 4.3   BILITOT 0.5   ALKPHOS 102   AST 23   ALT 23   ANIONGAP 14   EGFRNONAA >60.0       Significant Imaging: I have reviewed and interpreted all pertinent imaging results/findings within the past 24 hours.     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  Clinical correlation and follow-up advised    Assessment/Plan:     * Sepsis    Febrile, tachycardic, no leukocytosis but R shift present. Lactic acid elevated 3.3. 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 UA not indicative of infection.   Check blood cultures, LDH, procalcitonin, repeat lactic acid.   Cipro/Flagyl initiated for intra-abdominal infection;  no other suspected source.   Scheduled phenergan, prn zofran/morphine/ativan for symptom management.   IVF infusing. Hemodynamically stable.          Diarrhea of presumed infectious origin    Onset of symptoms correlates temporally to fish ingested for dinner last night.   Will send off for stool cultures, O/C/P, C diff, WBC.   IVF resuscitation and lyte repletion.   IV morphine for severe pain control, tylenol for fevers and mild pain.      Intractable nausea and vomiting    Scheduled IV phenergan q6h, PRN IV zofran q8h, IV ativan 1 mg for breakthrough N/V not controlled with zofran/phenergan.   EKG to check baseline QTc.   IVF resuscitation, at 150 ml/h x15h.   S/p 2L bolus in ED.   CT abdo not consistent w/ obstruction.        VTE Risk Mitigation (From admission, onward)        Ordered     enoxaparin injection 40 mg  Daily      02/17/19 1517     IP VTE HIGH RISK PATIENT  Once      02/17/19 1517     Place sequential compression device  Until discontinued      02/17/19 1303             Juanita Fernandez MD  Department of Hospital Medicine   [facility]

## 2019-02-17 NOTE — ASSESSMENT & PLAN NOTE
Febrile, tachycardic, no leukocytosis but R shift present. Lactic acid elevated 3.3. 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 UA not indicative of infection.   Check blood cultures, LDH, procalcitonin, repeat lactic acid.   Cipro/Flagyl initiated for intra-abdominal infection; no other suspected source.   Scheduled phenergan, prn zofran/morphine/ativan for symptom management.   IVF infusing. Hemodynamically stable.

## 2019-02-17 NOTE — ED NOTES
Hourly rounding performed at this time.  at bedside. Patient is in bed awake and alert, resting, reports pain has decreased to 5/10 and she is feeling only slightly nauseous, dry heaving has decreased markedly. Assessed for need of repositioning and checked positioning of purewick, no adjustment required. Discussed care plan, patient denies any additional needs at this time and has no complaints or questions. Room assessed for safety measures and cleanliness, no action needed at this time. The bed is in low, locked position with side rails up x 2. Personal belongings and call light in reach. Patient is oriented to call light use. Patient verbalizes will call nurse if assistance is needed.

## 2019-02-17 NOTE — ASSESSMENT & PLAN NOTE
Onset of symptoms correlates temporally to fish ingested for dinner last night.   Will send off for stool cultures, O/C/P, C diff, WBC.   IVF resuscitation and lyte repletion.   IV morphine for severe pain control, tylenol for fevers and mild pain.

## 2019-02-17 NOTE — HPI
"Mrs. Brown is a 64 yo F w/ GERD, hypothyroid, IBS who presented to the ED in the early hours of 2/17 with sudden onset abdominal pain (described as 10/10 in intensity, cramping in nature, and transient and wandering in location) which began about 10pm last night. Symptoms were associated with multiple episodes (>10) of vomiting (nonbloody) that did not improve following zofran and phenergan at home. Additionally, she had 3 "soft, but not runny" non-bloody bowel movements. She believes her symptoms were precipitated by fish she had at dinner around 8pm. She has never had an episode like this, was in her usual state of health prior to symptom onset, and denies fevers/chills, SOB, CP, syncope, rash/wound, sick contacts.     In the ED, she was found to be febrile, tachycardic, and in acute distress d/t pain/nausea which was not improved with phenergan/zofran. Her pain was transiently improved with morphine but returned 1 hour later. Surgery was consulted due to concern for acute abdomen and they recommended serial abdo exams and hydration. A CT showed subtle induration small bowel mesentery with few scattered nonenlarged mesenteric lymph nodes while nonspecific differential to include mesenteric panniculitis.   "

## 2019-02-17 NOTE — ED NOTES
"Pt now more relaxed in bed, states that the nausea is "a little better".  Pt updated on status, awaiting CT scan.  "

## 2019-02-17 NOTE — ED NOTES
Hourly rounding performed at this time.  at bedside. Patient is in bed awake and alert, resting, reports pain continues to be manageable still 5/10 and still nauseous without dry heaving. Assessed for need of repositioning and checked positioning of purewick, no adjustment required. Patient instructed stool sample needed, she verbalized understanding and reports she does not have to have a BM at this time, but will alert nurse when she does, so it can be collected. Discussed care plan, patient denies any additional needs at this time and has no complaints or questions. Room assessed for safety measures and cleanliness, no action needed at this time. The bed is in low, locked position with side rails up x 2. Personal belongings and call light in reach. Patient is oriented to call light use. Patient verbalizes will call nurse if assistance is needed.

## 2019-02-18 LAB
ALBUMIN SERPL BCP-MCNC: 3.1 G/DL
ALP SERPL-CCNC: 71 U/L
ALT SERPL W/O P-5'-P-CCNC: 16 U/L
ANION GAP SERPL CALC-SCNC: 8 MMOL/L
AST SERPL-CCNC: 16 U/L
BASOPHILS # BLD AUTO: 0.01 K/UL
BASOPHILS NFR BLD: 0.2 %
BILIRUB SERPL-MCNC: 0.4 MG/DL
BUN SERPL-MCNC: 8 MG/DL
CALCIUM SERPL-MCNC: 8 MG/DL
CHLORIDE SERPL-SCNC: 108 MMOL/L
CO2 SERPL-SCNC: 24 MMOL/L
CREAT SERPL-MCNC: 0.7 MG/DL
DIFFERENTIAL METHOD: ABNORMAL
EOSINOPHIL # BLD AUTO: 0 K/UL
EOSINOPHIL NFR BLD: 0.9 %
ERYTHROCYTE [DISTWIDTH] IN BLOOD BY AUTOMATED COUNT: 14.2 %
EST. GFR  (AFRICAN AMERICAN): >60 ML/MIN/1.73 M^2
EST. GFR  (NON AFRICAN AMERICAN): >60 ML/MIN/1.73 M^2
GLUCOSE SERPL-MCNC: 106 MG/DL
HCT VFR BLD AUTO: 33.7 %
HGB BLD-MCNC: 11.1 G/DL
IMM GRANULOCYTES # BLD AUTO: 0.01 K/UL
IMM GRANULOCYTES NFR BLD AUTO: 0.2 %
LACTATE SERPL-SCNC: 0.9 MMOL/L
LYMPHOCYTES # BLD AUTO: 0.8 K/UL
LYMPHOCYTES NFR BLD: 17.1 %
MAGNESIUM SERPL-MCNC: 1.8 MG/DL
MCH RBC QN AUTO: 29.7 PG
MCHC RBC AUTO-ENTMCNC: 32.9 G/DL
MCV RBC AUTO: 90 FL
MONOCYTES # BLD AUTO: 0.4 K/UL
MONOCYTES NFR BLD: 8.1 %
NEUTROPHILS # BLD AUTO: 3.3 K/UL
NEUTROPHILS NFR BLD: 73.5 %
NRBC BLD-RTO: 0 /100 WBC
PLATELET # BLD AUTO: 213 K/UL
PMV BLD AUTO: 9.4 FL
POTASSIUM SERPL-SCNC: 3.2 MMOL/L
PROT SERPL-MCNC: 5.8 G/DL
RBC # BLD AUTO: 3.74 M/UL
SODIUM SERPL-SCNC: 140 MMOL/L
WBC # BLD AUTO: 4.45 K/UL

## 2019-02-18 PROCEDURE — 36410 VNPNXR 3YR/> PHY/QHP DX/THER: CPT

## 2019-02-18 PROCEDURE — G0378 HOSPITAL OBSERVATION PER HR: HCPCS

## 2019-02-18 PROCEDURE — S0030 INJECTION, METRONIDAZOLE: HCPCS | Performed by: PHYSICIAN ASSISTANT

## 2019-02-18 PROCEDURE — 87045 FECES CULTURE AEROBIC BACT: CPT

## 2019-02-18 PROCEDURE — 87427 SHIGA-LIKE TOXIN AG IA: CPT

## 2019-02-18 PROCEDURE — 99225 PR SUBSEQUENT OBSERVATION CARE,LEVEL II: CPT | Mod: ,,, | Performed by: HOSPITALIST

## 2019-02-18 PROCEDURE — 87209 SMEAR COMPLEX STAIN: CPT

## 2019-02-18 PROCEDURE — 76937 US GUIDE VASCULAR ACCESS: CPT

## 2019-02-18 PROCEDURE — C1751 CATH, INF, PER/CENT/MIDLINE: HCPCS

## 2019-02-18 PROCEDURE — 25000003 PHARM REV CODE 250: Performed by: PHYSICIAN ASSISTANT

## 2019-02-18 PROCEDURE — 63600175 PHARM REV CODE 636 W HCPCS: Performed by: HOSPITALIST

## 2019-02-18 PROCEDURE — 99225 PR SUBSEQUENT OBSERVATION CARE,LEVEL II: ICD-10-PCS | Mod: ,,, | Performed by: HOSPITALIST

## 2019-02-18 PROCEDURE — 63700000 PHARM REV CODE 250 ALT 637 W/O HCPCS: Performed by: HOSPITALIST

## 2019-02-18 PROCEDURE — 83735 ASSAY OF MAGNESIUM: CPT

## 2019-02-18 PROCEDURE — 36415 COLL VENOUS BLD VENIPUNCTURE: CPT

## 2019-02-18 PROCEDURE — 85025 COMPLETE CBC W/AUTO DIFF WBC: CPT

## 2019-02-18 PROCEDURE — 87046 STOOL CULTR AEROBIC BACT EA: CPT | Mod: 59

## 2019-02-18 PROCEDURE — 80053 COMPREHEN METABOLIC PANEL: CPT

## 2019-02-18 PROCEDURE — 25000003 PHARM REV CODE 250: Performed by: HOSPITALIST

## 2019-02-18 PROCEDURE — 83605 ASSAY OF LACTIC ACID: CPT

## 2019-02-18 PROCEDURE — 63600175 PHARM REV CODE 636 W HCPCS: Performed by: PHYSICIAN ASSISTANT

## 2019-02-18 PROCEDURE — 89055 LEUKOCYTE ASSESSMENT FECAL: CPT

## 2019-02-18 PROCEDURE — A4216 STERILE WATER/SALINE, 10 ML: HCPCS | Performed by: HOSPITALIST

## 2019-02-18 RX ORDER — ONDANSETRON 8 MG/1
8 TABLET, ORALLY DISINTEGRATING ORAL EVERY 6 HOURS PRN
Status: DISCONTINUED | OUTPATIENT
Start: 2019-02-18 | End: 2019-02-19 | Stop reason: HOSPADM

## 2019-02-18 RX ORDER — BISACODYL 10 MG
10 SUPPOSITORY, RECTAL RECTAL ONCE
Status: COMPLETED | OUTPATIENT
Start: 2019-02-18 | End: 2019-02-18

## 2019-02-18 RX ORDER — POTASSIUM CHLORIDE 7.45 MG/ML
10 INJECTION INTRAVENOUS
Status: ACTIVE | OUTPATIENT
Start: 2019-02-18 | End: 2019-02-18

## 2019-02-18 RX ORDER — POTASSIUM CHLORIDE 7.45 MG/ML
10 INJECTION INTRAVENOUS
Status: DISPENSED | OUTPATIENT
Start: 2019-02-18 | End: 2019-02-18

## 2019-02-18 RX ORDER — FLUVOXAMINE MALEATE 100 MG/1
100 TABLET, COATED ORAL 2 TIMES DAILY
Status: DISCONTINUED | OUTPATIENT
Start: 2019-02-18 | End: 2019-02-19 | Stop reason: HOSPADM

## 2019-02-18 RX ORDER — MORPHINE SULFATE 2 MG/ML
2 INJECTION, SOLUTION INTRAMUSCULAR; INTRAVENOUS
Status: DISCONTINUED | OUTPATIENT
Start: 2019-02-18 | End: 2019-02-19 | Stop reason: HOSPADM

## 2019-02-18 RX ADMIN — POTASSIUM CHLORIDE 10 MEQ: 10 INJECTION, SOLUTION INTRAVENOUS at 02:02

## 2019-02-18 RX ADMIN — Medication 2 MG: at 08:02

## 2019-02-18 RX ADMIN — PROMETHAZINE HYDROCHLORIDE 12.5 MG: 25 INJECTION INTRAMUSCULAR; INTRAVENOUS at 06:02

## 2019-02-18 RX ADMIN — BISACODYL 10 MG: 10 SUPPOSITORY RECTAL at 05:02

## 2019-02-18 RX ADMIN — Medication 2 MG: at 11:02

## 2019-02-18 RX ADMIN — LORAZEPAM 0.5 MG: 0.5 TABLET ORAL at 09:02

## 2019-02-18 RX ADMIN — METRONIDAZOLE 500 MG: 500 INJECTION, SOLUTION INTRAVENOUS at 09:02

## 2019-02-18 RX ADMIN — POTASSIUM CHLORIDE 10 MEQ: 10 INJECTION, SOLUTION INTRAVENOUS at 07:02

## 2019-02-18 RX ADMIN — Medication 5 ML: at 02:02

## 2019-02-18 RX ADMIN — Medication 2 MG: at 02:02

## 2019-02-18 RX ADMIN — CIPROFLOXACIN 400 MG: 2 INJECTION, SOLUTION INTRAVENOUS at 02:02

## 2019-02-18 RX ADMIN — ENOXAPARIN SODIUM 40 MG: 100 INJECTION SUBCUTANEOUS at 05:02

## 2019-02-18 RX ADMIN — ROSUVASTATIN CALCIUM 10 MG: 10 TABLET, FILM COATED ORAL at 09:02

## 2019-02-18 RX ADMIN — LORAZEPAM 1 MG: 2 INJECTION INTRAMUSCULAR; INTRAVENOUS at 09:02

## 2019-02-18 RX ADMIN — FLUVOXAMINE MALEATE 100 MG: 100 TABLET, FILM COATED ORAL at 09:02

## 2019-02-18 RX ADMIN — Medication 2 MG: at 04:02

## 2019-02-18 RX ADMIN — ONDANSETRON 4 MG: 2 INJECTION INTRAMUSCULAR; INTRAVENOUS at 02:02

## 2019-02-18 RX ADMIN — ACETAMINOPHEN 650 MG: 650 SUPPOSITORY RECTAL at 03:02

## 2019-02-18 RX ADMIN — FLUVOXAMINE MALEATE 100 MG: 100 TABLET, FILM COATED ORAL at 12:02

## 2019-02-18 RX ADMIN — ACETAMINOPHEN 650 MG: 650 SUPPOSITORY RECTAL at 12:02

## 2019-02-18 RX ADMIN — POTASSIUM CHLORIDE 10 MEQ: 10 INJECTION, SOLUTION INTRAVENOUS at 10:02

## 2019-02-18 RX ADMIN — ONDANSETRON 8 MG: 8 TABLET, ORALLY DISINTEGRATING ORAL at 02:02

## 2019-02-18 RX ADMIN — METRONIDAZOLE 500 MG: 500 INJECTION, SOLUTION INTRAVENOUS at 05:02

## 2019-02-18 NOTE — SUBJECTIVE & OBJECTIVE
Interval History: Doing better this morning. Nausea and pain are subsiding. Still complaining of frontal, throbbing headache, not exacerbated by light/noice. No visual changes. Will advance diet as tolerated and trial motrin for HA.     Review of Systems   Constitutional: Negative for activity change, appetite change, chills, diaphoresis, fatigue, fever and unexpected weight change.   Eyes: Negative for photophobia and visual disturbance.   Respiratory: Negative for apnea, cough, choking, shortness of breath, wheezing and stridor.    Cardiovascular: Negative for chest pain, palpitations and leg swelling.   Gastrointestinal: Positive for abdominal pain, diarrhea, nausea and vomiting. Negative for blood in stool and constipation.   Endocrine: Negative for cold intolerance and heat intolerance.   Genitourinary: Negative for difficulty urinating and dysuria.   Musculoskeletal: Negative for arthralgias and myalgias.   Skin: Negative for rash and wound.   Allergic/Immunologic: Negative for immunocompromised state.   Neurological: Positive for headaches. Negative for dizziness and weakness.   Psychiatric/Behavioral: Negative for agitation, behavioral problems and confusion. The patient is nervous/anxious.      Objective:     Vital Signs (Most Recent):  Temp: 99.6 °F (37.6 °C) (02/18/19 1218)  Pulse: 95 (02/18/19 1220)  Resp: 17 (02/18/19 1218)  BP: 139/67 (02/18/19 1218)  SpO2: 98 % (02/18/19 1218) Vital Signs (24h Range):  Temp:  [99.3 °F (37.4 °C)-102.2 °F (39 °C)] 99.6 °F (37.6 °C)  Pulse:  [] 95  Resp:  [16-26] 17  SpO2:  [93 %-99 %] 98 %  BP: (118-146)/(55-68) 139/67     Weight: 102 kg (224 lb 13.9 oz)  Body mass index is 36.29 kg/m².    Intake/Output Summary (Last 24 hours) at 2/18/2019 1230  Last data filed at 2/18/2019 0806  Gross per 24 hour   Intake 4887.5 ml   Output 1350 ml   Net 3537.5 ml      Physical Exam   Constitutional: She is oriented to person, place, and time. She appears well-developed and  well-nourished. She appears ill. No distress.   HENT:   Head: Normocephalic and atraumatic.   Mouth/Throat: No oropharyngeal exudate.   Eyes: Conjunctivae and EOM are normal. Pupils are equal, round, and reactive to light. No scleral icterus.   Neck: Normal range of motion. Neck supple. No tracheal deviation present. No thyromegaly present.   Cardiovascular: Regular rhythm, normal heart sounds and intact distal pulses. Tachycardia present.   No murmur heard.  Pulmonary/Chest: Effort normal and breath sounds normal. No respiratory distress. She has no wheezes. She has no rales. She exhibits no tenderness.   Abdominal: Soft. She exhibits no distension. Bowel sounds are decreased. There is tenderness. There is no rebound and no guarding.   Musculoskeletal: Normal range of motion. She exhibits no edema or tenderness.   Lymphadenopathy:     She has no cervical adenopathy.   Neurological: She is alert and oriented to person, place, and time.   Skin: Skin is warm and dry. No rash noted. She is not diaphoretic. No erythema. No pallor.   Psychiatric: Her behavior is normal. Judgment and thought content normal. Her mood appears anxious.       Significant Labs:   A1C: No results for input(s): HGBA1C in the last 4320 hours.  CBC:   Recent Labs   Lab 02/17/19  0758 02/18/19  0839   WBC 9.21 4.45   HGB 13.6 11.1*   HCT 42.8 33.7*    213     CMP:   Recent Labs   Lab 02/17/19  0758 02/17/19  1820 02/18/19  0838    137 140   K 4.7 3.4* 3.2*    101 108   CO2 22* 24 24   * 107 106   BUN 16 11 8   CREATININE 0.8 0.8 0.7   CALCIUM 9.7 8.9 8.0*   PROT 8.1  --  5.8*   ALBUMIN 4.3  --  3.1*   BILITOT 0.5  --  0.4   ALKPHOS 102  --  71   AST 23  --  16   ALT 23  --  16   ANIONGAP 14 12 8   EGFRNONAA >60.0 >60.0 >60.0       Significant Imaging: I have reviewed and interpreted all pertinent imaging results/findings within the past 24 hours.

## 2019-02-18 NOTE — ASSESSMENT & PLAN NOTE
-Patient continues to improve. No surgical intervention needed. Discussed with patient and  at bedside  -Care per primary team  -Surgery will sign off

## 2019-02-18 NOTE — ASSESSMENT & PLAN NOTE
Febrile, tachycardic, no leukocytosis but R shift present. Lactic acid elevated 3.3. 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 UA not indicative of infection.   Check blood cultures (NGTD), LDH (WNL), procalcitonin (equivocal), repeat lactic acid (normalized).   Cipro/Flagyl initiated for intra-abdominal infection; no other suspected source.   Scheduled phenergan, prn zofran/morphine/ativan for symptom management.   IVF infusing. Hemodynamically stable.

## 2019-02-18 NOTE — SUBJECTIVE & OBJECTIVE
Interval History: Feels better this morning. Main complaints are of a persistent headache and back pain. Nausea improved; no emesis.    Medications:  Continuous Infusions:  Scheduled Meds:   ciprofloxacin  400 mg Intravenous Q12H    enoxaparin  40 mg Subcutaneous Daily    fluvoxaMINE  100 mg Oral BID    levothyroxine  50 mcg Oral Before breakfast    LORazepam  0.5 mg Oral BID    metronidazole  500 mg Intravenous Q8H    promethazine (PHENERGAN) IVPB  12.5 mg Intravenous Q6H    rosuvastatin  10 mg Oral Daily     PRN Meds:acetaminophen, lorazepam, morphine, ondansetron, sodium chloride 0.9%, sodium chloride 0.9%, zolpidem     Review of patient's allergies indicates:   Allergen Reactions    Compazine [prochlorperazine] Anxiety     Objective:     Vital Signs (Most Recent):  Temp: 99.4 °F (37.4 °C) (02/18/19 0803)  Pulse: 82 (02/18/19 0803)  Resp: 16 (02/18/19 0803)  BP: (!) 126/59 (02/18/19 0803)  SpO2: 96 % (02/18/19 0803) Vital Signs (24h Range):  Temp:  [99.3 °F (37.4 °C)-102.2 °F (39 °C)] 99.4 °F (37.4 °C)  Pulse:  [] 82  Resp:  [16-38] 16  SpO2:  [93 %-100 %] 96 %  BP: (118-184)/(55-93) 126/59     Weight: 102 kg (224 lb 13.9 oz)  Body mass index is 36.29 kg/m².    Intake/Output - Last 3 Shifts       02/16 0700 - 02/17 0659 02/17 0700 - 02/18 0659 02/18 0700 - 02/19 0659    P.O.  150     I.V. (mL/kg)  268.5 (2.6) 1970 (19.3)    IV Piggyback  3500     Total Intake(mL/kg)  3918.5 (38.4) 1970 (19.3)    Urine (mL/kg/hr)  1770 (0.7)     Total Output  1770     Net  +2148.5 +1970                 Physical Exam   Constitutional: She is oriented to person, place, and time. She appears well-developed and well-nourished.   HENT:   Head: Normocephalic and atraumatic.   Cardiovascular: Normal rate and regular rhythm.   Pulmonary/Chest: Effort normal and breath sounds normal.   Abdominal: Soft. Bowel sounds are normal. She exhibits no distension. There is no tenderness. There is no guarding.       Well-healed  surgical incision. Less distention compared to previous exams.   Neurological: She is alert and oriented to person, place, and time.   Skin: Skin is warm and dry.       Significant Labs:  CBC:   Recent Labs   Lab 02/18/19  0839   WBC 4.45   RBC 3.74*   HGB 11.1*   HCT 33.7*      MCV 90   MCH 29.7   MCHC 32.9     CMP:   Recent Labs   Lab 02/18/19  0838      CALCIUM 8.0*   ALBUMIN 3.1*   PROT 5.8*      K 3.2*   CO2 24      BUN 8   CREATININE 0.7   ALKPHOS 71   ALT 16   AST 16   BILITOT 0.4       Significant Diagnostics:  I have reviewed all pertinent imaging results/findings within the past 24 hours.

## 2019-02-18 NOTE — NURSING
Mil ROBLEDO called and states the medications that are ordered should cover pts current status. Informed of pts urine output that is within range but not great due to the amount of IV fluids administered. States will inform the daytime doctor of pts status. States the doctor who admitted the pt is back today. No new orders received at this time

## 2019-02-18 NOTE — PLAN OF CARE
02/18/19 1100   Discharge Assessment   Assessment Type Discharge Planning Assessment   Confirmed/corrected address and phone number on facesheet? Yes   Assessment information obtained from? Patient   Expected Length of Stay (days) 2   Communicated expected length of stay with patient/caregiver yes   Prior to hospitilization cognitive status: Alert/Oriented   Prior to hospitalization functional status: Independent   Current Functional Status: Independent   Lives With spouse   Able to Return to Prior Arrangements yes   Patient's perception of discharge disposition home or selfcare   Readmission Within the Last 30 Days no previous admission in last 30 days   Patient currently being followed by outpatient case management? No   Patient currently receives any other outside agency services? No   Equipment Currently Used at Home none   Do you have any problems affording any of your prescribed medications? No   Is the patient taking medications as prescribed? yes   Does the patient have transportation home? Yes   Discharge Plan A Home with family   Discharge Plan B Home with family   Patient/Family in Agreement with Plan yes

## 2019-02-18 NOTE — ASSESSMENT & PLAN NOTE
Onset of symptoms correlates temporally to fish ingested for dinner last night.   Will send off for stool cultures, O/C/P, C diff, WBC.   IVF resuscitation and lyte repletion.   IV morphine for severe pain control, tylenol for fevers and mild pain.     Update, HD1: Has not had another BM, so unlikely C diff infx. Will d/c precautions.

## 2019-02-18 NOTE — PROGRESS NOTES
[facility]  General Surgery  Progress Note    Subjective:     History of Present Illness:  Halina Borwn is a 63 y.o. female with no significant past medical history who presents to the ER today after having multiple bouts of emesis and loose stools beginning last night after eating fish for dinner. She feels her abdomen is tight. She also feels the need to have more bowel movements but after having had loose stools last night now feels constipated. She continues to have significant nausea. She has a surgical hx of hysterectomy, sigmoidectomy, and cholecystectomy.       Post-Op Info:  * No surgery found *         Interval History: Feels better this morning. Main complaints are of a persistent headache and back pain. Nausea improved; no emesis.    Medications:  Continuous Infusions:  Scheduled Meds:   ciprofloxacin  400 mg Intravenous Q12H    enoxaparin  40 mg Subcutaneous Daily    fluvoxaMINE  100 mg Oral BID    levothyroxine  50 mcg Oral Before breakfast    LORazepam  0.5 mg Oral BID    metronidazole  500 mg Intravenous Q8H    promethazine (PHENERGAN) IVPB  12.5 mg Intravenous Q6H    rosuvastatin  10 mg Oral Daily     PRN Meds:acetaminophen, lorazepam, morphine, ondansetron, sodium chloride 0.9%, sodium chloride 0.9%, zolpidem     Review of patient's allergies indicates:   Allergen Reactions    Compazine [prochlorperazine] Anxiety     Objective:     Vital Signs (Most Recent):  Temp: 99.4 °F (37.4 °C) (02/18/19 0803)  Pulse: 82 (02/18/19 0803)  Resp: 16 (02/18/19 0803)  BP: (!) 126/59 (02/18/19 0803)  SpO2: 96 % (02/18/19 0803) Vital Signs (24h Range):  Temp:  [99.3 °F (37.4 °C)-102.2 °F (39 °C)] 99.4 °F (37.4 °C)  Pulse:  [] 82  Resp:  [16-38] 16  SpO2:  [93 %-100 %] 96 %  BP: (118-184)/(55-93) 126/59     Weight: 102 kg (224 lb 13.9 oz)  Body mass index is 36.29 kg/m².    Intake/Output - Last 3 Shifts       02/16 0700 - 02/17 0659 02/17 0700 - 02/18 0659 02/18 0700 - 02/19  0659    P.O.  150     I.V. (mL/kg)  268.5 (2.6) 1970 (19.3)    IV Piggyback  3500     Total Intake(mL/kg)  3918.5 (38.4) 1970 (19.3)    Urine (mL/kg/hr)  1770 (0.7)     Total Output  1770     Net  +2148.5 +1970                 Physical Exam   Constitutional: She is oriented to person, place, and time. She appears well-developed and well-nourished.   HENT:   Head: Normocephalic and atraumatic.   Cardiovascular: Normal rate and regular rhythm.   Pulmonary/Chest: Effort normal and breath sounds normal.   Abdominal: Soft. Bowel sounds are normal. She exhibits no distension. There is no tenderness. There is no guarding.       Well-healed surgical incision. Less distention compared to previous exams.   Neurological: She is alert and oriented to person, place, and time.   Skin: Skin is warm and dry.       Significant Labs:  CBC:   Recent Labs   Lab 02/18/19  0839   WBC 4.45   RBC 3.74*   HGB 11.1*   HCT 33.7*      MCV 90   MCH 29.7   MCHC 32.9     CMP:   Recent Labs   Lab 02/18/19  0838      CALCIUM 8.0*   ALBUMIN 3.1*   PROT 5.8*      K 3.2*   CO2 24      BUN 8   CREATININE 0.7   ALKPHOS 71   ALT 16   AST 16   BILITOT 0.4       Significant Diagnostics:  I have reviewed all pertinent imaging results/findings within the past 24 hours.    Assessment/Plan:     Diarrhea of presumed infectious origin    -Patient continues to improve. No surgical intervention needed. Discussed with patient and  at bedside  -Care per primary team  -Surgery will sign off         Marce Armstrong MD  General Surgery  [facility]

## 2019-02-18 NOTE — HPI
Halina Brown is a 63 y.o. female with no significant past medical history who presents to the ER today after having multiple bouts of emesis and loose stools beginning last night after eating fish for dinner. She feels her abdomen is tight. She also feels the need to have more bowel movements but after having had loose stools last night now feels constipated. She continues to have significant nausea. She has a surgical hx of hysterectomy, sigmoidectomy, and cholecystectomy.

## 2019-02-18 NOTE — NURSING
IV site to the Right foot discontinued per protocol with catheter tip intact. Pressure dressing applied. Positive blood return noted but pt complained of pain when flushed. No swelling or redness noted to Right foot. Pt tolerated well

## 2019-02-18 NOTE — NURSING
Pt complains of Morphine not being as effective IM and it worked better IVP. Pt informed will call the doctor to inform.    Mil paged via  and returned call. Pts diagnosis and medications discusssed along with CT results. Informed pt complaining of Morphine not being as effective as an IM shot. Informed pt refuses to take any thing by mouth but pt has had a few ice chips per request. Pt asked with PA on phone if she can take something by mouth and pt states no. Pt states nausea is better than on admit but still borderline. PA states will look at pts chart and decide on plan of care; states to call when finished in pts room.

## 2019-02-18 NOTE — PLAN OF CARE
Problem: Adult Inpatient Plan of Care  Goal: Plan of Care Review  Outcome: Ongoing (interventions implemented as appropriate)  Plan of care continued per orders. IV fluids in progress. SCDs in place. Contact Isolation and fall precautions maintained. Call light in reach. Albright in place with no loops. Telemetry monitor in place. Pain control increasing. I/Os monitored. Pts temp is starting to decrease from 101.7 to 100.4. Comfort and safety maintained. Reassurance given.

## 2019-02-18 NOTE — PROGRESS NOTES
"[facility] Hospital Medicine  Progress Note    Patient Name: Halina Brown  MRN: [id number]  Patient Class: OP- Observation   Admission Date: 2/17/2019  Length of Stay: 0 days  Attending Physician: Juanita Fernandez*  Primary Care Provider: Sarah Beth Johnson ([facility])    [place] [facility] Medicine Team: [facility] HOSP MED R Juanita Fernandez MD    Subjective:     Principal Problem:Sepsis    HPI:  Mrs. Brown is a 64 yo F w/ GERD, hypothyroid, IBS who presented to the ED in the early hours of 2/17 with sudden onset abdominal pain (described as 10/10 in intensity, cramping in nature, and transient and wandering in location) which began about 10pm last night. Symptoms were associated with multiple episodes (>10) of vomiting (nonbloody) that did not improve following zofran and phenergan at home. Additionally, she had 3 "soft, but not runny" non-bloody bowel movements. She believes her symptoms were precipitated by fish she had at dinner around 8pm. She has never had an episode like this, was in her usual state of health prior to symptom onset, and denies fevers/chills, SOB, CP, syncope, rash/wound, sick contacts.     In the ED, she was found to be febrile, tachycardic, and in acute distress d/t pain/nausea which was not improved with phenergan/zofran. Her pain was transiently improved with morphine but returned 1 hour later. Surgery was consulted due to concern for acute abdomen and they recommended serial abdo exams and hydration. A CT showed subtle induration small bowel mesentery with few scattered nonenlarged mesenteric lymph nodes while nonspecific differential to include mesenteric panniculitis.     Hospital Course:  No notes on file    Interval History: Doing better this morning. Nausea and pain are subsiding. Still complaining of frontal, throbbing headache, not exacerbated by light/noice. No visual changes. Will advance diet as tolerated and trial motrin for HA.     Review of Systems "   Constitutional: Negative for activity change, appetite change, chills, diaphoresis, fatigue, fever and unexpected weight change.   Eyes: Negative for photophobia and visual disturbance.   Respiratory: Negative for apnea, cough, choking, shortness of breath, wheezing and stridor.    Cardiovascular: Negative for chest pain, palpitations and leg swelling.   Gastrointestinal: Positive for abdominal pain, diarrhea, nausea and vomiting. Negative for blood in stool and constipation.   Endocrine: Negative for cold intolerance and heat intolerance.   Genitourinary: Negative for difficulty urinating and dysuria.   Musculoskeletal: Negative for arthralgias and myalgias.   Skin: Negative for rash and wound.   Allergic/Immunologic: Negative for immunocompromised state.   Neurological: Positive for headaches. Negative for dizziness and weakness.   Psychiatric/Behavioral: Negative for agitation, behavioral problems and confusion. The patient is nervous/anxious.      Objective:     Vital Signs (Most Recent):  Temp: 99.6 °F (37.6 °C) (02/18/19 1218)  Pulse: 95 (02/18/19 1220)  Resp: 17 (02/18/19 1218)  BP: 139/67 (02/18/19 1218)  SpO2: 98 % (02/18/19 1218) Vital Signs (24h Range):  Temp:  [99.3 °F (37.4 °C)-102.2 °F (39 °C)] 99.6 °F (37.6 °C)  Pulse:  [] 95  Resp:  [16-26] 17  SpO2:  [93 %-99 %] 98 %  BP: (118-146)/(55-68) 139/67     Weight: 102 kg (224 lb 13.9 oz)  Body mass index is 36.29 kg/m².    Intake/Output Summary (Last 24 hours) at 2/18/2019 1230  Last data filed at 2/18/2019 0806  Gross per 24 hour   Intake 4887.5 ml   Output 1350 ml   Net 3537.5 ml      Physical Exam   Constitutional: She is oriented to person, place, and time. She appears well-developed and well-nourished. She appears ill. No distress.   HENT:   Head: Normocephalic and atraumatic.   Mouth/Throat: No oropharyngeal exudate.   Eyes: Conjunctivae and EOM are normal. Pupils are equal, round, and reactive to light. No scleral icterus.   Neck: Normal  range of motion. Neck supple. No tracheal deviation present. No thyromegaly present.   Cardiovascular: Regular rhythm, normal heart sounds and intact distal pulses. Tachycardia present.   No murmur heard.  Pulmonary/Chest: Effort normal and breath sounds normal. No respiratory distress. She has no wheezes. She has no rales. She exhibits no tenderness.   Abdominal: Soft. She exhibits no distension. Bowel sounds are decreased. There is tenderness. There is no rebound and no guarding.   Musculoskeletal: Normal range of motion. She exhibits no edema or tenderness.   Lymphadenopathy:     She has no cervical adenopathy.   Neurological: She is alert and oriented to person, place, and time.   Skin: Skin is warm and dry. No rash noted. She is not diaphoretic. No erythema. No pallor.   Psychiatric: Her behavior is normal. Judgment and thought content normal. Her mood appears anxious.       Significant Labs:   A1C: No results for input(s): HGBA1C in the last 4320 hours.  CBC:   Recent Labs   Lab 02/17/19  0758 02/18/19  0839   WBC 9.21 4.45   HGB 13.6 11.1*   HCT 42.8 33.7*    213     CMP:   Recent Labs   Lab 02/17/19  0758 02/17/19  1820 02/18/19  0838    137 140   K 4.7 3.4* 3.2*    101 108   CO2 22* 24 24   * 107 106   BUN 16 11 8   CREATININE 0.8 0.8 0.7   CALCIUM 9.7 8.9 8.0*   PROT 8.1  --  5.8*   ALBUMIN 4.3  --  3.1*   BILITOT 0.5  --  0.4   ALKPHOS 102  --  71   AST 23  --  16   ALT 23  --  16   ANIONGAP 14 12 8   EGFRNONAA >60.0 >60.0 >60.0       Significant Imaging: I have reviewed and interpreted all pertinent imaging results/findings within the past 24 hours.    Assessment/Plan:      * Sepsis    Febrile, tachycardic, no leukocytosis but R shift present. Lactic acid elevated 3.3. 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 UA not indicative of infection.   Check blood cultures (NGTD), LDH (WNL), procalcitonin (equivocal), repeat lactic acid  (normalized).   Cipro/Flagyl initiated for intra-abdominal infection; no other suspected source.   Scheduled phenergan, prn zofran/morphine/ativan for symptom management.   IVF infusing. Hemodynamically stable.         Diarrhea of presumed infectious origin    Onset of symptoms correlates temporally to fish ingested for dinner last night.   Will send off for stool cultures, O/C/P, C diff, WBC.   IVF resuscitation and lyte repletion.   IV morphine for severe pain control, tylenol for fevers and mild pain.     Update, HD1: Has not had another BM, so unlikely C diff infx. Will d/c precautions.      Intractable nausea and vomiting    Scheduled IV phenergan q6h, PRN IV zofran q8h, IV ativan 1 mg for breakthrough N/V not controlled with zofran/phenergan.   EKG to check baseline QTc.   IVF resuscitation, at 150 ml/h x15h.   S/p 2L bolus in ED.   CT abdo not consistent w/ obstruction.   Will advance diet today (2/18) as tolerated.   Replete K+       VTE Risk Mitigation (From admission, onward)        Ordered     enoxaparin injection 40 mg  Daily      02/17/19 1517     IP VTE HIGH RISK PATIENT  Once      02/17/19 1517     Place sequential compression device  Until discontinued      02/17/19 1303              Juanita Fernandez MD  Department of Hospital Medicine   [facility]

## 2019-02-18 NOTE — NURSING
PA paged to inform of pts elevated temp and complaints of persistant nausea after receiving all prn meds. Awaiting return call

## 2019-02-18 NOTE — NURSING
Pt in bed requesting something for nausea and pain. Pt states she was told that the nurse would bring meds as ordered to stay on top of pain and nausea. All meds and the time due discussed with pt. Pt informed will call the doctor to inform of request for possible Benadryl for nausea not relieved by Zofran or Phenergan. Pt informed of Contact Isolation until stool specimen is collected to rule out Cdiff. Albright care discussed and performed. Pt instructed to call for any pain or leaking or swelling from IV site to the foot. Pt encouraged to allow me to attempt another IV site in the arm, pt agreed. Fall precautions discussed, call light in reach. Reassurance given. Comfort and safety maintained

## 2019-02-18 NOTE — CONSULTS
ADELA consulted for PVA    Arrived to patients room, patient says she is to be discharged today    Spoke with nurse- plans are to give her po meds and evaluate her po intake    No PVA needed at this time- will re-evaluate after MD rounds

## 2019-02-18 NOTE — NURSING
"Pt in bed at this time with  at bedside. MD Fernandez rounded for eval earlier on arrival. Pt was tachypneic mid 20-30s RR on RA with O2 >95%. C/O abd pain and inability to urinate with purewick in place. Bladder scan showed > 600 cc, per MD, ok to insert and keep indwelling cardona at this time. 700 cc noted to cardona clear yellow urine. Temp started 101.3, ice packs applied, decreased to 99.9, then rebounded back to 102.2 oral, warm to touch. Medicated with suppository tylenol for temp, pt NPO and currently unable to tolerate meds by mouth at this time. Additional c/o nausea and dry heaves, has been medicated with zofran, phenergan and ativan. Pain treated with IM Morphine, will discussed with MD if adjustment may be necessary, MD to adjust if medication renders ineffective. 0.9 NS infusing to # 20 at R foot c/d/i @ 150cc/hr. Lab did come to bedside, most recent LA reported to MD. Pt currently calm after stating "i'm not gonna make it" earlier, states nausea somewhat improved but has mild headache. Is receiving abx as ordered per MD. BP stable 130-140s, tele montior in place scoping  ST 100s, 12-lead EKG obtained earlier as well. On RA and unlabored at this time with family updated on POC. Will prepare report to oncoming shift nurse.  "

## 2019-02-18 NOTE — ASSESSMENT & PLAN NOTE
Scheduled IV phenergan q6h, PRN IV zofran q8h, IV ativan 1 mg for breakthrough N/V not controlled with zofran/phenergan.   EKG to check baseline QTc.   IVF resuscitation, at 150 ml/h x15h.   S/p 2L bolus in ED.   CT abdo not consistent w/ obstruction.   Will advance diet today (2/18) as tolerated.   Replete K+

## 2019-02-18 NOTE — CONSULTS
Midline placed in right cephalic vein, 20g x 8cm size.  Max dwell date 3/19/2019.  Lot# [id number].  Needle advanced using realtime u/s guidance.

## 2019-02-19 VITALS
OXYGEN SATURATION: 96 % | WEIGHT: 224.88 LBS | DIASTOLIC BLOOD PRESSURE: 65 MMHG | TEMPERATURE: 99 F | HEIGHT: 66 IN | SYSTOLIC BLOOD PRESSURE: 130 MMHG | RESPIRATION RATE: 18 BRPM | HEART RATE: 85 BPM | BODY MASS INDEX: 36.14 KG/M2

## 2019-02-19 LAB
ALBUMIN SERPL BCP-MCNC: 3.3 G/DL
ALP SERPL-CCNC: 72 U/L
ALT SERPL W/O P-5'-P-CCNC: 20 U/L
ANION GAP SERPL CALC-SCNC: 5 MMOL/L
AST SERPL-CCNC: 20 U/L
BASOPHILS # BLD AUTO: 0.01 K/UL
BASOPHILS NFR BLD: 0.3 %
BILIRUB SERPL-MCNC: 0.3 MG/DL
BUN SERPL-MCNC: 6 MG/DL
CALCIUM SERPL-MCNC: 9.1 MG/DL
CHLORIDE SERPL-SCNC: 107 MMOL/L
CO2 SERPL-SCNC: 30 MMOL/L
CREAT SERPL-MCNC: 0.7 MG/DL
DIFFERENTIAL METHOD: ABNORMAL
EOSINOPHIL # BLD AUTO: 0.1 K/UL
EOSINOPHIL NFR BLD: 3.2 %
ERYTHROCYTE [DISTWIDTH] IN BLOOD BY AUTOMATED COUNT: 13.9 %
EST. GFR  (AFRICAN AMERICAN): >60 ML/MIN/1.73 M^2
EST. GFR  (NON AFRICAN AMERICAN): >60 ML/MIN/1.73 M^2
GLUCOSE SERPL-MCNC: 91 MG/DL
HCT VFR BLD AUTO: 35.7 %
HGB BLD-MCNC: 11.4 G/DL
IMM GRANULOCYTES # BLD AUTO: 0 K/UL
IMM GRANULOCYTES NFR BLD AUTO: 0 %
LYMPHOCYTES # BLD AUTO: 1.2 K/UL
LYMPHOCYTES NFR BLD: 31.6 %
MAGNESIUM SERPL-MCNC: 2.1 MG/DL
MCH RBC QN AUTO: 29.6 PG
MCHC RBC AUTO-ENTMCNC: 31.9 G/DL
MCV RBC AUTO: 93 FL
MONOCYTES # BLD AUTO: 0.6 K/UL
MONOCYTES NFR BLD: 14.6 %
NEUTROPHILS # BLD AUTO: 1.9 K/UL
NEUTROPHILS NFR BLD: 50.3 %
NRBC BLD-RTO: 0 /100 WBC
PHOSPHATE SERPL-MCNC: 3.1 MG/DL
PLATELET # BLD AUTO: 240 K/UL
PMV BLD AUTO: 9.4 FL
POTASSIUM SERPL-SCNC: 3.9 MMOL/L
PROT SERPL-MCNC: 6.5 G/DL
RBC # BLD AUTO: 3.85 M/UL
SODIUM SERPL-SCNC: 142 MMOL/L
WBC # BLD AUTO: 3.76 K/UL
WBC #/AREA STL HPF: NORMAL /[HPF]

## 2019-02-19 PROCEDURE — 99217 PR OBSERVATION CARE DISCHARGE: ICD-10-PCS | Mod: ,,, | Performed by: HOSPITALIST

## 2019-02-19 PROCEDURE — 85025 COMPLETE CBC W/AUTO DIFF WBC: CPT

## 2019-02-19 PROCEDURE — 99217 PR OBSERVATION CARE DISCHARGE: CPT | Mod: ,,, | Performed by: HOSPITALIST

## 2019-02-19 PROCEDURE — 25000003 PHARM REV CODE 250: Performed by: HOSPITALIST

## 2019-02-19 PROCEDURE — G0378 HOSPITAL OBSERVATION PER HR: HCPCS

## 2019-02-19 PROCEDURE — 83735 ASSAY OF MAGNESIUM: CPT

## 2019-02-19 PROCEDURE — 36415 COLL VENOUS BLD VENIPUNCTURE: CPT

## 2019-02-19 PROCEDURE — 80053 COMPREHEN METABOLIC PANEL: CPT

## 2019-02-19 PROCEDURE — 63600175 PHARM REV CODE 636 W HCPCS: Performed by: PHYSICIAN ASSISTANT

## 2019-02-19 PROCEDURE — 84100 ASSAY OF PHOSPHORUS: CPT

## 2019-02-19 PROCEDURE — 63600175 PHARM REV CODE 636 W HCPCS: Performed by: HOSPITALIST

## 2019-02-19 PROCEDURE — S0030 INJECTION, METRONIDAZOLE: HCPCS | Performed by: PHYSICIAN ASSISTANT

## 2019-02-19 PROCEDURE — 25000003 PHARM REV CODE 250: Performed by: PHYSICIAN ASSISTANT

## 2019-02-19 RX ORDER — CIPROFLOXACIN 750 MG/1
750 TABLET, FILM COATED ORAL EVERY 12 HOURS
Qty: 10 TABLET | Refills: 0 | Status: SHIPPED | OUTPATIENT
Start: 2019-02-19 | End: 2019-02-24

## 2019-02-19 RX ORDER — ACETAMINOPHEN 325 MG/1
650 TABLET ORAL EVERY 6 HOURS PRN
Status: DISCONTINUED | OUTPATIENT
Start: 2019-02-19 | End: 2019-02-19 | Stop reason: HOSPADM

## 2019-02-19 RX ORDER — METRONIDAZOLE 500 MG/1
500 TABLET ORAL EVERY 8 HOURS
Qty: 15 TABLET | Refills: 0 | Status: SHIPPED | OUTPATIENT
Start: 2019-02-19 | End: 2019-02-24

## 2019-02-19 RX ADMIN — ACETAMINOPHEN 650 MG: 325 TABLET ORAL at 12:02

## 2019-02-19 RX ADMIN — POTASSIUM CHLORIDE 10 MEQ: 10 INJECTION, SOLUTION INTRAVENOUS at 12:02

## 2019-02-19 RX ADMIN — PROMETHAZINE HYDROCHLORIDE 12.5 MG: 25 INJECTION INTRAMUSCULAR; INTRAVENOUS at 12:02

## 2019-02-19 RX ADMIN — CIPROFLOXACIN 400 MG: 2 INJECTION, SOLUTION INTRAVENOUS at 03:02

## 2019-02-19 RX ADMIN — LEVOTHYROXINE SODIUM 50 MCG: 50 TABLET ORAL at 06:02

## 2019-02-19 RX ADMIN — ROSUVASTATIN CALCIUM 10 MG: 10 TABLET, FILM COATED ORAL at 08:02

## 2019-02-19 RX ADMIN — PROMETHAZINE HYDROCHLORIDE 12.5 MG: 25 INJECTION INTRAMUSCULAR; INTRAVENOUS at 06:02

## 2019-02-19 RX ADMIN — ONDANSETRON 4 MG: 2 INJECTION INTRAMUSCULAR; INTRAVENOUS at 04:02

## 2019-02-19 RX ADMIN — PROMETHAZINE HYDROCHLORIDE 12.5 MG: 25 INJECTION INTRAMUSCULAR; INTRAVENOUS at 11:02

## 2019-02-19 RX ADMIN — LORAZEPAM 0.5 MG: 0.5 TABLET ORAL at 08:02

## 2019-02-19 RX ADMIN — METRONIDAZOLE 500 MG: 500 INJECTION, SOLUTION INTRAVENOUS at 05:02

## 2019-02-19 RX ADMIN — Medication 2 MG: at 04:02

## 2019-02-19 NOTE — PLAN OF CARE
Problem: Adult Inpatient Plan of Care  Goal: Plan of Care Review  Outcome: Ongoing (interventions implemented as appropriate)  POC reviewed with Pt.  Pt c/o intermittent nausea. PO  zofran SL given as prescribed.  Scheduled IV medications delayed due to no IV access. Midline placed by PICC Team at 1640. IV potassium riders rescheduled for 1900.  Stool sample collected and sent to lab. No diarrhea noted. Pt tolerating sips of water. Safety precautions maintained. Bed in low position wheels locked. Side rails up x 2. Call light within reach. Pt free of falls.

## 2019-02-19 NOTE — NURSING
"Pt asking for IV ativan, med is not loaded in pyxis awaiting for delivery.    Pt also asking to have her morphine dose increased, and for some more nausea medicine. Patient states she would like to be given "everything you can give now at once". Informed patient I could not do that.  "

## 2019-02-19 NOTE — NURSING
Pt DC per MD orders. Dc and prescription reviewed with Pt and spouse. Pt verbalizes understanding. MIdline removed cathter tip intact. VSS. NAD. Pt waiting on transportation.

## 2019-02-19 NOTE — PLAN OF CARE
Problem: Adult Inpatient Plan of Care  Goal: Plan of Care Review  Outcome: Outcome(s) achieved Date Met: 02/19/19  POC reviewed with Pt. Pt tolerating diet. No BM noted during this shift.  Tylenol PO given for HA. Safety precautions maintained. Pt free of falls. Pt up ad tavares.

## 2019-02-20 LAB — O+P STL TRI STN: NORMAL

## 2019-02-20 NOTE — DISCHARGE SUMMARY
"Discharge Summary  Hospital Medicine    Attending Provider on Discharge: Malik Barney MD  [facility] Hospital Medicine Team: [facility] R  Date of Admission:  2/17/2019     Date of Discharge:  2/19/2019  Length of Stay:  LOS: 0 days   Code status: Full Code    Active Hospital Problems    Diagnosis  POA    *Sepsis [A41.9]  Yes    Intractable nausea and vomiting [R11.2]  Yes    Diarrhea of presumed infectious origin [R19.7]  Yes      Resolved Hospital Problems   No resolved problems to display.        HPI  62 yo F w/ GERD, hypothyroid, IBS who presented to the ED in the early hours of 2/17 with sudden onset abdominal pain (described as 10/10 in intensity, cramping in nature, and transient and wandering in location) which began about 10pm last night. Symptoms were associated with multiple episodes (>10) of vomiting (nonbloody) that did not improve following zofran and phenergan at home. Additionally, she had 3 "soft, but not runny" non-bloody bowel movements. She believes her symptoms were precipitated by fish she had at dinner around 8pm. She has never had an episode like this, was in her usual state of health prior to symptom onset, and denies fevers/chills, SOB, CP, syncope, rash/wound, sick contacts.     In the ED, she was found to be febrile, tachycardic, and in acute distress d/t pain/nausea which was not improved with phenergan/zofran. Her pain was transiently improved with morphine but returned 1 hour later. Surgery was consulted due to concern for acute abdomen and they recommended serial abdo exams and hydration. A CT showed subtle induration small bowel mesentery with few scattered nonenlarged mesenteric lymph nodes while nonspecific differential to include mesenteric panniculitis.     Hospital Course    Halina Brown is a 63 y.o. female with no significant past medical history who presented to the ER after having multiple bouts of emesis and loose stools beginning last night after eating fish for " dinner. She feels her abdomen is tight. She also feels the need to have more bowel movements but after having had loose stools last night now feels constipated. She continues to have significant nausea. She has a surgical hx of hysterectomy, sigmoidectomy, and cholecystectomy. CT scan was negative for any acute findings other than some non specific inflammation. Started on PO Cipro and flagyl and IV fluids which significantly improved her symptoms, was able to tolerate PO intake. Discharged with a 7 day total course of antibiotics and follow up with her GI doctor in [place], [place]. Seen by General surgery who recommended conservative management.         Recent Labs   Lab 02/17/19 0758 02/18/19  0839 02/19/19  1059   WBC 9.21 4.45 3.76*   HGB 13.6 11.1* 11.4*   HCT 42.8 33.7* 35.7*    213 240     Recent Labs   Lab 02/17/19 0758 02/17/19 1820 02/18/19  0838 02/19/19  1059    137 140 142   K 4.7 3.4* 3.2* 3.9    101 108 107   CO2 22* 24 24 30*   BUN 16 11 8 6*   CREATININE 0.8 0.8 0.7 0.7   * 107 106 91   CALCIUM 9.7 8.9 8.0* 9.1   MG  --  1.8 1.8 2.1   PHOS  --  3.5  --  3.1   LIPASE 14  --   --   --      Recent Labs   Lab 02/17/19 0758 02/17/19  1821 02/18/19  0838 02/19/19  1059   ALKPHOS 102  --  71 72   ALT 23  --  16 20   AST 23  --  16 20   ALBUMIN 4.3  --  3.1* 3.3*   PROT 8.1  --  5.8* 6.5   BILITOT 0.5  --  0.4 0.3   INR  --  0.9  --   --       No results for input(s): CPK, CPKMB, MB, TROPONINI in the last 72 hours.  Recent Labs     02/18/19  0838   LACTATE 0.9       No results for input(s): POCTGLUCOSE in the last 168 hours.   No results found for: HGBA1C   Results for orders placed or performed during the hospital encounter of 02/17/19   CT Abdomen Pelvis With Contrast    Narrative    EXAMINATION:  CT ABDOMEN PELVIS WITH CONTRAST    CLINICAL HISTORY:  Abdominal distension;    TECHNIQUE:  Low dose axial images, sagittal and coronal reformations were obtained from the lung bases to  the pubic symphysis following the IV administration of 100 mL of Omnipaque 350 .  Oral contrast was not given.    COMPARISON:  None.    FINDINGS:  There is no consolidation in the visualized lungs.  The liver is enlarged measuring 19 cm in craniocaudal dimension.  There is diffuse decreased attenuation liver concerning for fatty infiltration.  Subcentimeter hypodensity within the right lobe of the liver which most suggestive for cyst with Hounsfield units of 6.    There is slight    The spleen and pancreas are normal in size without focal lesion.  Postoperative change from cholecystectomy with surgical clips in the gallbladder fossa.  The adrenal glands are within normal limits bilaterally.    The kidneys are normal in size without hydronephrosis.  There is uptake and excretion of contrast bilaterally.  No signs of appendicitis with normal appendix identified.    Remote operative change from hysterectomy.  No focal pelvic fluid collection or adenopathy.  There is operative change from partial sigmoid resection with anastomosis.  There is colonic diverticulosis without evidence for acute diverticulitis.  No focal dilated loop of bowel to suggest bowel obstruction with scattered fluid and gas within loops of small bowel.    Atherosclerotic plaquing of the aorta.    Gas density within left gluteal soft tissues.    Subtle induration with the small bowel mesentery with few nonenlarged mesenteric lymph nodes concerning for possible mesenteric panniculitis.      Impression    Hepatomegaly with diffuse fatty infiltration of the liver.    Subtle induration small bowel mesentery with few scattered nonenlarged mesenteric lymph nodes while nonspecific differential to include mesenteric panniculitis.  Clinical correlation and follow-up advised    Small right hepatic cyst.    Remote operative change from cholecystectomy, partial sigmoid resection and hysterectomy.    Colonic diverticulosis without evidence for acute  diverticulitis.    Otherwise unremarkable CT scan abdomen pelvis as detailed above      Electronically signed by: Margarito Ramirez DO  Date:    02/17/2019  Time:    10:20         Procedures: none     Consultants: General surgery,     Discharge Medication List as of 2/19/2019  2:07 PM      START taking these medications    Details   ciprofloxacin HCl (CIPRO) 750 MG tablet Take 1 tablet (750 mg total) by mouth every 12 (twelve) hours. for 5 days, Starting Tue 2/19/2019, Until Sun 2/24/2019, Normal      metroNIDAZOLE (FLAGYL) 500 MG tablet Take 1 tablet (500 mg total) by mouth every 8 (eight) hours. Caution. It is very important that you do not drink any type of alcohol while using this medication. for 5 days, Starting Tue 2/19/2019, Until Sun 2/24/2019, Print         CONTINUE these medications which have NOT CHANGED    Details   CRESTOR 10 mg tablet Starting 1/18/2016, Until Discontinued, Historical Med      fluvoxamine (LUVOX) 50 MG Tab Starting 1/4/2016, Until Discontinued, Historical Med      hydrocodone-acetaminophen 10-325mg (NORCO)  mg Tab Starting 1/17/2016, Until Discontinued, Historical Med      lorazepam (ATIVAN) 1 MG tablet Starting 2/2/2016, Until Discontinued, Historical Med      ondansetron (ZOFRAN-ODT) 8 MG TbDL Take 1 tablet (8 mg total) by mouth every 8 (eight) hours as needed., Starting 2/10/2016, Until Discontinued, Normal      pantoprazole (PROTONIX) 40 MG tablet Take 1 tablet (40 mg total) by mouth once daily., Starting 3/10/2016, Until Discontinued, Normal      promethazine (PHENERGAN) 25 MG tablet Take 1 tablet (25 mg total) by mouth every 4 (four) hours as needed for Nausea., Starting 2/10/2016, Until Discontinued, Normal      sodium,potassium,&mag sulfates (SUPREP BOWEL PREP KIT) 17.5-3.13-1.6 gram SolR Take as directed in instructions, Normal      SYNTHROID 50 mcg tablet Starting 1/11/2016, Until Discontinued, Historical Med      zolpidem (AMBIEN) 10 mg Tab Starting 2/2/2016, Until  Discontinued, Historical Med         STOP taking these medications       amitriptyline (ELAVIL) 25 MG tablet Comments:   Reason for Stopping:         dronabinol (MARINOL) 2.5 MG capsule Comments:   Reason for Stopping:         hydrocodone-acetaminophen 5-325mg (NORCO) 5-325 mg per tablet Comments:   Reason for Stopping:         LYRICA 50 mg capsule Comments:   Reason for Stopping:         ranitidine (ZANTAC) 150 MG tablet Comments:   Reason for Stopping:         tamsulosin (FLOMAX) 0.4 mg Cp24 Comments:   Reason for Stopping:         triamterene-hydrochlorothiazide 37.5-25 mg (DYAZIDE) 37.5-25 mg per capsule Comments:   Reason for Stopping:               Discharge Diet:regular diet with Normal Fluid intake of 1500 - 2000 mL per day    Activity: activity as tolerated    Discharge Condition: Stable    Disposition: Home or Self Care    Tests pending at the time of discharge: none      Time spent  on the discharge of the patient including review of hospital course with the patient. reviewing discharge medications and arranging follow-up care 35 minutes     Discharge examination Patient was seen and examined on the date of discharge and determined to be suitable for discharge.    Discharge plan   Follow up with GI clinic in [facility] where patient resides.     No future appointments.    Malik Barney MD

## 2019-02-21 LAB
BACTERIA STL CULT: NORMAL
BACTERIA STL CULT: NORMAL

## 2019-02-22 LAB — BACTERIA BLD CULT: NORMAL

## 2023-08-09 NOTE — ED NOTES
Dr Lyons at bedside   This is a secondary encounter, please see original encounter from 08/08/2023 for additional information. There is also a 3rd encounter regarding samples for the pt for this medication as well.